# Patient Record
Sex: FEMALE | Race: WHITE | NOT HISPANIC OR LATINO
[De-identification: names, ages, dates, MRNs, and addresses within clinical notes are randomized per-mention and may not be internally consistent; named-entity substitution may affect disease eponyms.]

---

## 2017-10-05 ENCOUNTER — APPOINTMENT (OUTPATIENT)
Dept: WOUND CARE | Facility: CLINIC | Age: 67
End: 2017-10-05

## 2020-09-03 ENCOUNTER — TRANSCRIPTION ENCOUNTER (OUTPATIENT)
Age: 70
End: 2020-09-03

## 2020-11-28 ENCOUNTER — TRANSCRIPTION ENCOUNTER (OUTPATIENT)
Age: 70
End: 2020-11-28

## 2020-12-21 ENCOUNTER — TRANSCRIPTION ENCOUNTER (OUTPATIENT)
Age: 70
End: 2020-12-21

## 2022-07-07 ENCOUNTER — NON-APPOINTMENT (OUTPATIENT)
Age: 72
End: 2022-07-07

## 2022-09-26 ENCOUNTER — APPOINTMENT (OUTPATIENT)
Dept: ORTHOPEDIC SURGERY | Facility: CLINIC | Age: 72
End: 2022-09-26

## 2022-12-12 ENCOUNTER — NON-APPOINTMENT (OUTPATIENT)
Age: 72
End: 2022-12-12

## 2023-06-06 ENCOUNTER — APPOINTMENT (OUTPATIENT)
Dept: ORTHOPEDIC SURGERY | Facility: CLINIC | Age: 73
End: 2023-06-06
Payer: MEDICARE

## 2023-06-06 ENCOUNTER — NON-APPOINTMENT (OUTPATIENT)
Age: 73
End: 2023-06-06

## 2023-06-06 VITALS
DIASTOLIC BLOOD PRESSURE: 81 MMHG | BODY MASS INDEX: 32.07 KG/M2 | SYSTOLIC BLOOD PRESSURE: 145 MMHG | WEIGHT: 181 LBS | HEART RATE: 77 BPM | HEIGHT: 63 IN

## 2023-06-06 PROCEDURE — 99204 OFFICE O/P NEW MOD 45 MIN: CPT

## 2023-06-06 NOTE — REASON FOR VISIT
[Initial Visit] : an initial visit for [Hip Pain] : hip pain [Family Member] : family member [FreeTextEntry2] : Right THR 10/13/22, Revision Right THR 11/3/22

## 2023-06-06 NOTE — END OF VISIT
[Time Spent: ___ minutes] : I have spent [unfilled] minutes of time on the encounter. [FreeTextEntry3] : I, Dr. Nathan Funes MD, personally performed the evaluation and management services for this established patient who presented today with a new problem/exacerbation of an existing condition. That E/M includes conducting the examination, assessing all new/exacerbated conditions, and establishing a new plan of care. Today, MY ACP was here to assist with recording the history in my evaluation and management services of this new problem/exacerbated condition to be followed going forward.\par

## 2023-06-06 NOTE — HISTORY OF PRESENT ILLNESS
[de-identified] : Patient is a 72-year-old female who presents today for an evaluation regarding her right hip.  Originally she underwent a right total hip replacement performed by Dr. Gaming in [place] on October 13, 2022.  She states that following the hip replacement she did receive some physical therapy.  But shortly afterwards started to develop an increase in discomfort.  Follow-up x-rays show that she had a periprosthetic fracture.  Therefore she underwent a additional surgery and a revision as well as to repair the periprosthetic fracture of the proximal femur.  She states that following her revision.  She did follow with Dr. Gaming who advised her to try some physical therapy and anti-inflammatories.  However she states that the pain still persisted and now felt a click.  She states that she had some difficulty ambulating and therefore return back to the office several times.  Most recently approximately 3 weeks ago the patient did receive a MRI to evaluate the prosthesis.  However unfortunately about a week later while at home.  She states that she twisted and felt her right hip dislocate.  She was taken via EMS to [facility] where it was relocated.  And when she was discharged that same day.  She states that she return back to Dr. Gaming's office the following day.  Where it was advised that she may use an abduction brace.  But to start physical therapy slowly.  She presents today complaining of significant discomfort in her right hip.  As well as a sensation of a click and weird sensation when ambulating.  She states that she does have a sensation of it about to dislocate however she did not have a second dislocation.  As this does persist.  She decided to seek medical attention.  Therefore she presents here in the office today.

## 2023-06-06 NOTE — PHYSICAL EXAM
[Antalgic] : antalgic [LE] : Sensory: Intact in bilateral lower extremities [ALL] : dorsalis pedis, posterior tibial, femoral, popliteal, and radial 2+ and symmetric bilaterally [de-identified] : On physical examination of the right hip.  Patient is status post revision of a right total hip replacement from the anterior approach.  There is a well-healed surgical scar with no redness swelling heat or discharge noted.  There is some diffuse pain and tenderness on the lateral aspect and anterior laterally.  But also there is discomfort within the groin during ranges of motion.  She does have adequate range of motion in all planes.  However patient is very hesitant during the examination.  This is done both passive and actively.  There is no evidence of any palpable defect.  No evidence of any motor or sensory deficit.  Patient has good distal pulses no calf tenderness.  No evidence of limb length discrepancy. [de-identified] : No x-rays were performed in today's office visit.  However x-rays performed approximately 2 weeks ago with Dr. Gaming were reviewed.  It shows longstem prosthesis with cerclage wires due to the periprosthetic fracture.  The hardware is in place with no evidence of any fracture dislocation or loosening of the hardware.

## 2023-06-06 NOTE — DISCUSSION/SUMMARY
[de-identified] : After thorough history full examination and review of the x-rays.  It was explained to the patient that she is to continue with conservative management.  This includes a slow physical therapy with an exercise program at home along with ice packs/moist and anti-inflammatories as needed.  However as the pain still persist.  It is recommended that a full work-up for a possible infection.  This includes blood work such as CBC with differential BMP sed rate and C-reactive protein to be drawn.  It was explained to the patient that if any of these numbers are elevated and shows a possibility of infection.  An aspiration with collection referred to the lab is warranted.  She is advised to try this conservative management.  To notify the office once the blood work is performed to discuss its findings and further medical management.\par \par I, Dr. Funes, personally performed the evaluation and management services for this established patient who presents today with an orthopedic condition. The evaluation and management includes conducting the conditions and establishing a plan of care.\par \par Today, my ACP Jett Wild [facility], was here to assist with the patient in my evaluation and management services for this condition which will be followed going forward.\par \par 45 minutes were spent, face to face, in direct consultation with the patient. This includes reviewing the natural history of their Dx., eliciting the history, performing an orthopedic exam, review of the x-ray findings, forming a differential Dx and discussing all treatment options. This Includes both surgical and non-surgical treatments. I also reviewed all the risks and benefits of non-operative & operative Tx options, future impact into orthopedic functions/problems, activity restrictions both at home and at work, and all follow up requirements.\par \par

## 2023-07-18 ENCOUNTER — APPOINTMENT (OUTPATIENT)
Dept: ORTHOPEDIC SURGERY | Facility: CLINIC | Age: 73
End: 2023-07-18
Payer: MEDICARE

## 2023-07-18 VITALS — HEIGHT: 60 IN | WEIGHT: 180 LBS | BODY MASS INDEX: 35.34 KG/M2

## 2023-07-18 DIAGNOSIS — Z00.00 ENCOUNTER FOR GENERAL ADULT MEDICAL EXAMINATION W/OUT ABNORMAL FINDINGS: ICD-10-CM

## 2023-07-18 DIAGNOSIS — Z78.9 OTHER SPECIFIED HEALTH STATUS: ICD-10-CM

## 2023-07-18 PROCEDURE — 99204 OFFICE O/P NEW MOD 45 MIN: CPT

## 2023-07-18 PROCEDURE — 73503 X-RAY EXAM HIP UNI 4/> VIEWS: CPT | Mod: RT

## 2023-07-18 PROCEDURE — 72100 X-RAY EXAM L-S SPINE 2/3 VWS: CPT

## 2023-07-18 NOTE — DISCUSSION/SUMMARY
[de-identified] : The patient was advised of the diagnosis.  The natural history of the pathology was explained in full to the patient in layman's terms. All questions were answered.  The risks and benefits of surgical and non-surgical treatment alternatives were explained in full to the patient.\par \par The natural progression of Osteoarthritis was explained to the patient.  We discussed the possible treatment options from conservative to operative.  These included NSAIDS, Glucosamine and Chondrotin sulfate, and Physical Therapy as well different types of injections.  We also discussed that at some point they may progress to needed a JOHN.  Information and pamphlets were given when appropriate.\par \par Patient Complains of pain in Hip with a level that often reaches greater than a 8/10. The Pain has been progressively worsening of his/her treatment coarse. The pain has interfered with their ADLs and worsens with weight bearing. On exam pain worsens with ROM passive and active and I measured a limited ROM.\par \par After a period of more than 12 weeks physical therapy or exercise program done with me or another treating physician they have continued pain. The patient has failed a trial of NSAID medication or pain relieves if they were unable to tolerate NSAID medications. After a long discussion with the patient both the patient and I have decided we have exhausted all forms of less radical treatments and they would like to proceed with Total Hip Revision\par \par We discussed my findings and the natural history of their condition.  We talked about the details of the proposed surgery and the recovery.  We discussed the material risks, possible benefits and alternatives to surgery.  The risks include but are not limited to infection, bleeding and possible need for blood transfusion, fracture, bowel blockage, bladder retention or infection, need for reoperation, stiffness and/or limited range of motion, possible damage to nerves and blood vessels, failure of fixation of components, risk of deep vein thromboses and pulmonary embolism, wound healing problems, dislocation, and possible leg length discrepancy.  Although incredibly rare, we also discussed the risks of a cardiac event, stroke and even death during, or following, the surgery.  We discussed the type of implants the patient will be receiving and the type of fixation that will be used, as well as whether a robot or computer navigation aide will be used.  The patient understands they will need medical clearance and will attend a preoperative joint education class.  We also discussed the type of anesthesia they will receive, and the risks associated with hospital or rehab length of stay, obesity, diabetes and smoking. \par \par Progress note completed by Aurora Manning PA-C, working under the supervision of Dr. Stinson.

## 2023-07-18 NOTE — DATA REVIEWED
[FreeTextEntry1] : MRI R hip ([facility]) 4/27/23\par Status post total right hip arthroplasty. Moderate joint effusion decompressing into a fluid collection at level of the greater trochanter.\par Degenerative changes in lower lumbar spine.

## 2023-07-18 NOTE — IMAGING
[Disc space narrowing] : Disc space narrowing [Scoliosis] : Scoliosis [Right] : right hip with pelvis [Components well fixed, in good position] : Components well fixed, in good position [de-identified] : RIGHT HIP \par +abductor tenderness\par NVI\par +Trendelenburg gait\par Uses cane \par \par BACK \par +Bilateral lumbar tenderness\par Limited ROM in all planes

## 2023-07-18 NOTE — ASSESSMENT
[FreeTextEntry1] : 7/18/23: Hx of R hip revision due to periprosthetic fx; hx of 2 dislocations. Lumbar DDD and scoliosis. +Trendelenburg gait. Femur implant looks good, acetabular component is questionable. MRI reviewed today- abductor injury.  She had bloodwork done recently to r/o infection. Her combination of rigid spine, lumbar DDD, scoliosis, abductor weakness and revision of the hip likely led to her dislocations. She understands that she is at an increased risk of repeat dislocations. Discussed revision- dual mobility vs. cup exchange; possible abductor repair. Will obtain CT scan R hip to eval for bony alignment of the cup. Will plan for R JOHN revision- depuy gription with dual mobility and constrained liner. Backup reclaim liner. exactech alteon femoral component. Will need exactech heads. Acetabular component was size 36 50

## 2023-07-18 NOTE — HISTORY OF PRESENT ILLNESS
[9] : 9 [7] : 7 [Dull/Aching] : dull/aching [] : yes [de-identified] : 7/18/23: 71yo F presents with her daughter for evaluation of her right hip. She has a hx of R JOHN done on 10/15/22 (Dr. Gaming) and a revision done on 11/3/22 (due to periprosthetic fx- no known injury). States she never felt much relief after the revision. Within the past couple of months, she has dislocated the hip twice. States there was no injury at the time of dislocation. She continues to have feelings of instability or "slipping." She had bloodwork done recently to r/o infection. Takes Advil prn.  [FreeTextEntry5] : had right hip sx 10/15/22 revision on 11/3/22 pain has gotten worse, popped out and then fell has gone to the ER pain worse walking is walking with a cane for stability has tried Advil

## 2023-07-23 ENCOUNTER — FORM ENCOUNTER (OUTPATIENT)
Age: 73
End: 2023-07-23

## 2023-07-24 ENCOUNTER — APPOINTMENT (OUTPATIENT)
Dept: CT IMAGING | Facility: CLINIC | Age: 73
End: 2023-07-24
Payer: MEDICARE

## 2023-07-24 PROCEDURE — 73700 CT LOWER EXTREMITY W/O DYE: CPT | Mod: RT

## 2023-07-24 PROCEDURE — 76376 3D RENDER W/INTRP POSTPROCES: CPT | Mod: RT

## 2023-08-15 ENCOUNTER — APPOINTMENT (OUTPATIENT)
Dept: ORTHOPEDIC SURGERY | Facility: CLINIC | Age: 73
End: 2023-08-15
Payer: MEDICARE

## 2023-08-15 VITALS — WEIGHT: 180 LBS | BODY MASS INDEX: 35.34 KG/M2 | HEIGHT: 60 IN

## 2023-08-15 PROCEDURE — 99214 OFFICE O/P EST MOD 30 MIN: CPT

## 2023-08-15 NOTE — DISCUSSION/SUMMARY
[de-identified] : We had an extensive discussion regarding surgical intervention including risk, benefits and alternatives.  The risks include, but are not limited to infection, bleeding, injury to small nerves and blood vessels, pain, stiffness, phlebitis, DVT malunion, nonunion, and need for secondary procedures.  Preoperative, intraoperative and postoperative care were discussed and outlined to the patient as well.  The patient has had an opportunity to ask any question and all were answered to the best of my ability.  Progress note completed by Aurora Manning PA-C.

## 2023-08-15 NOTE — HISTORY OF PRESENT ILLNESS
[6] : 6 [4] : 4 [Dull/Aching] : dull/aching [Radiating] : radiating [] : yes [de-identified] : 8/15/23: Here for f/up R hip. She has obtained the CT. She is on the schedule for R JOHN revision for 10/12/23.   Previous doc:  7/18/23: 73yo F presents with her daughter for evaluation of her right hip. She has a hx of R JOHN done on 10/15/22 (Dr. Gaming) and a revision done on 11/3/22 (due to periprosthetic fx- no known injury). States she never felt much relief after the revision. Within the past couple of months, she has dislocated the hip twice. States there was no injury at the time of dislocation. She continues to have feelings of instability or "slipping." She had bloodwork done recently to r/o infection. Takes Advil prn.

## 2023-08-15 NOTE — IMAGING
[de-identified] : RIGHT HIP \par  +abductor tenderness\par  NVI\par  +Trendelenburg gait\par  Uses cane \par  \par  BACK \par  +Bilateral lumbar tenderness\par  Limited ROM in all planes

## 2023-08-15 NOTE — ASSESSMENT
[FreeTextEntry1] : 7/18/23: Hx of R hip revision due to periprosthetic fx; hx of 2 dislocations. Lumbar DDD and scoliosis. +Trendelenburg gait. Femur implant looks good, acetabular component is questionable. MRI reviewed today- abductor injury.  She had bloodwork done recently to r/o infection. Her combination of rigid spine, lumbar DDD, scoliosis, abductor weakness and revision of the hip likely led to her dislocations. She understands that she is at an increased risk of repeat dislocations. Discussed revision- dual mobility vs. cup exchange; possible abductor repair. Will obtain CT scan R hip to eval for bony alignment of the cup. Will plan for R JOHN revision- depuy gription with dual mobility and constrained liner. Backup reclaim liner. exactech alteon femoral component. Will need exactech heads. Acetabular component was size 36 50  8/15/23: Reviewed the right hip CT. Discussed R JOHN revision, r/b/a, and preop/postop periods with the patient and her daughter in detail. Informed that we could also wait and observe at this point for her to further heal and scar in- she understands that she may or may not dislocate again. She is on the schedule for revision for 10/12/23. I thought that her cup is somewhat neutral and anteverted. May revise part of the stem. Will eval abductors for any injury and do a capsular repair. PT rx provided. f/up in 6 weeks; will re-eval and see if we will proceed with surgery.

## 2023-08-23 ENCOUNTER — NON-APPOINTMENT (OUTPATIENT)
Age: 73
End: 2023-08-23

## 2023-09-22 ENCOUNTER — OUTPATIENT (OUTPATIENT)
Dept: OUTPATIENT SERVICES | Facility: HOSPITAL | Age: 73
LOS: 1 days | Discharge: HOME | End: 2023-09-22

## 2023-09-22 VITALS
DIASTOLIC BLOOD PRESSURE: 77 MMHG | OXYGEN SATURATION: 98 % | HEIGHT: 62 IN | RESPIRATION RATE: 18 BRPM | TEMPERATURE: 98 F | SYSTOLIC BLOOD PRESSURE: 137 MMHG | HEART RATE: 80 BPM | WEIGHT: 176.15 LBS

## 2023-09-22 DIAGNOSIS — Z96.662 PRESENCE OF LEFT ARTIFICIAL ANKLE JOINT: Chronic | ICD-10-CM

## 2023-09-22 DIAGNOSIS — M25.551 PAIN IN RIGHT HIP: ICD-10-CM

## 2023-09-22 DIAGNOSIS — F41.9 ANXIETY DISORDER, UNSPECIFIED: ICD-10-CM

## 2023-09-22 DIAGNOSIS — Z01.818 ENCOUNTER FOR OTHER PREPROCEDURAL EXAMINATION: ICD-10-CM

## 2023-09-22 DIAGNOSIS — Z01.812 ENCOUNTER FOR PREPROCEDURAL LABORATORY EXAMINATION: ICD-10-CM

## 2023-09-22 DIAGNOSIS — Z98.890 OTHER SPECIFIED POSTPROCEDURAL STATES: Chronic | ICD-10-CM

## 2023-09-22 DIAGNOSIS — Z87.19 PERSONAL HISTORY OF OTHER DISEASES OF THE DIGESTIVE SYSTEM: Chronic | ICD-10-CM

## 2023-09-22 DIAGNOSIS — N32.81 OVERACTIVE BLADDER: ICD-10-CM

## 2023-09-22 DIAGNOSIS — Z96.641 PRESENCE OF RIGHT ARTIFICIAL HIP JOINT: ICD-10-CM

## 2023-09-22 LAB
A1C WITH ESTIMATED AVERAGE GLUCOSE RESULT: 5.5 % — SIGNIFICANT CHANGE UP (ref 4–5.6)
ALBUMIN SERPL ELPH-MCNC: 3.7 G/DL — SIGNIFICANT CHANGE UP (ref 3.3–5)
ALP SERPL-CCNC: 99 U/L — SIGNIFICANT CHANGE UP (ref 40–120)
ALT FLD-CCNC: 23 U/L — SIGNIFICANT CHANGE UP (ref 12–78)
ANION GAP SERPL CALC-SCNC: 7 MMOL/L — SIGNIFICANT CHANGE UP (ref 5–17)
APTT BLD: 58.5 SEC — HIGH (ref 24.5–35.6)
AST SERPL-CCNC: 16 U/L — SIGNIFICANT CHANGE UP (ref 15–37)
BILIRUB SERPL-MCNC: 0.4 MG/DL — SIGNIFICANT CHANGE UP (ref 0.2–1.2)
BLD GP AB SCN SERPL QL: SIGNIFICANT CHANGE UP
BUN SERPL-MCNC: 25 MG/DL — HIGH (ref 7–23)
CALCIUM SERPL-MCNC: 9.2 MG/DL — SIGNIFICANT CHANGE UP (ref 8.5–10.1)
CHLORIDE SERPL-SCNC: 106 MMOL/L — SIGNIFICANT CHANGE UP (ref 96–108)
CO2 SERPL-SCNC: 26 MMOL/L — SIGNIFICANT CHANGE UP (ref 22–31)
CREAT SERPL-MCNC: 0.82 MG/DL — SIGNIFICANT CHANGE UP (ref 0.5–1.3)
EGFR: 75 ML/MIN/1.73M2 — SIGNIFICANT CHANGE UP
ESTIMATED AVERAGE GLUCOSE: 111 MG/DL — SIGNIFICANT CHANGE UP (ref 68–114)
GLUCOSE SERPL-MCNC: 99 MG/DL — SIGNIFICANT CHANGE UP (ref 70–99)
HCT VFR BLD CALC: 39.9 % — SIGNIFICANT CHANGE UP (ref 34.5–45)
HGB BLD-MCNC: 12.7 G/DL — SIGNIFICANT CHANGE UP (ref 11.5–15.5)
INR BLD: 0.88 RATIO — SIGNIFICANT CHANGE UP (ref 0.85–1.18)
MCHC RBC-ENTMCNC: 28.1 PG — SIGNIFICANT CHANGE UP (ref 27–34)
MCHC RBC-ENTMCNC: 31.8 G/DL — LOW (ref 32–36)
MCV RBC AUTO: 88.3 FL — SIGNIFICANT CHANGE UP (ref 80–100)
MRSA PCR RESULT.: SIGNIFICANT CHANGE UP
NRBC # BLD: 0 /100 WBCS — SIGNIFICANT CHANGE UP (ref 0–0)
PLATELET # BLD AUTO: 216 K/UL — SIGNIFICANT CHANGE UP (ref 150–400)
POTASSIUM SERPL-MCNC: 4.1 MMOL/L — SIGNIFICANT CHANGE UP (ref 3.5–5.3)
POTASSIUM SERPL-SCNC: 4.1 MMOL/L — SIGNIFICANT CHANGE UP (ref 3.5–5.3)
PROT SERPL-MCNC: 8 GM/DL — SIGNIFICANT CHANGE UP (ref 6–8.3)
PROTHROM AB SERPL-ACNC: 10.5 SEC — SIGNIFICANT CHANGE UP (ref 9.5–13)
RBC # BLD: 4.52 M/UL — SIGNIFICANT CHANGE UP (ref 3.8–5.2)
RBC # FLD: 13 % — SIGNIFICANT CHANGE UP (ref 10.3–14.5)
S AUREUS DNA NOSE QL NAA+PROBE: DETECTED
SODIUM SERPL-SCNC: 139 MMOL/L — SIGNIFICANT CHANGE UP (ref 135–145)
VIT D25+D1,25 OH+D1,25 PNL SERPL-MCNC: 49.9 PG/ML — SIGNIFICANT CHANGE UP (ref 19.9–79.3)
WBC # BLD: 7.88 K/UL — SIGNIFICANT CHANGE UP (ref 3.8–10.5)
WBC # FLD AUTO: 7.88 K/UL — SIGNIFICANT CHANGE UP (ref 3.8–10.5)

## 2023-09-22 RX ORDER — SERTRALINE 25 MG/1
1 TABLET, FILM COATED ORAL
Refills: 0 | DISCHARGE

## 2023-09-22 RX ORDER — SEMAGLUTIDE 0.68 MG/ML
1 INJECTION, SOLUTION SUBCUTANEOUS
Refills: 0 | DISCHARGE

## 2023-09-22 RX ORDER — ALBUTEROL 90 UG/1
2 AEROSOL, METERED ORAL
Refills: 0 | DISCHARGE

## 2023-09-22 RX ORDER — OXYBUTYNIN CHLORIDE 5 MG
1 TABLET ORAL
Refills: 0 | DISCHARGE

## 2023-09-22 RX ORDER — BUPROPION HYDROCHLORIDE 150 MG/1
1 TABLET, EXTENDED RELEASE ORAL
Refills: 0 | DISCHARGE

## 2023-09-22 NOTE — PHYSICAL THERAPY INITIAL EVALUATION ADULT - NS ASR RISK AREAS PT EVAL
normal... Well appearing, awake, alert, oriented to person, place, time/situation and in no apparent distress. fall

## 2023-09-22 NOTE — H&P PST ADULT - MUSCULOSKELETAL COMMENTS
Right hip pain Right Hip limited rom and pain with ambulation Right Hip limited rom and pain with ambulation, uses cane

## 2023-09-22 NOTE — H&P PST ADULT - ASSESSMENT
Right hip pain for right revision total hip replacement  CAPRINI SCORE [CLOT]    AGE RELATED RISK FACTORS                                                       MOBILITY RELATED FACTORS  [ ] Age 41-60 years                                            (1 Point)                  [ ] Bed rest                                                        (1 Point)  [ ] Age: 61-74 years                                           (2 Points)                 [ ] Plaster cast                                                   (2 Points)  [ x] Age= 75 years                                              (3 Points)                 [ ] Bed bound for more than 72 hours                 (2 Points)    DISEASE RELATED RISK FACTORS                                               GENDER SPECIFIC FACTORS  [ ] Edema in the lower extremities                       (1 Point)                  [ ] Pregnancy                                                     (1 Point)  [ ] Varicose veins                                               (1 Point)                  [ ] Post-partum < 6 weeks                                   (1 Point)             [ ] BMI > 25 Kg/m2                                            (1 Point)                  [ ] Hormonal therapy  or oral contraception          (1 Point)                 [ ] Sepsis (in the previous month)                        (1 Point)                  [ ] History of pregnancy complications                 (1 point)  [ ] Pneumonia or serious lung disease                                               [ ] Unexplained or recurrent                     (1 Point)           (in the previous month)                               (1 Point)  [ ] Abnormal pulmonary function test                     (1 Point)                 SURGERY RELATED RISK FACTORS  [ ] Acute myocardial infarction                              (1 Point)                 [ ]  Section                                             (1 Point)  [ ] Congestive heart failure (in the previous month)  (1 Point)               [ ] Minor surgery                                                  (1 Point)   [ ] Inflammatory bowel disease                             (1 Point)                 [ ] Arthroscopic surgery                                        (2 Points)  [ ] Central venous access                                      (2 Points)                [ ] General surgery lasting more than 45 minutes   (2 Points)       [ ] Stroke (in the previous month)                          (5 Points)               [x ] Elective arthroplasty                                         (5 Points)                                                                                                                                               HEMATOLOGY RELATED FACTORS                                                 TRAUMA RELATED RISK FACTORS  [ ] Prior episodes of VTE                                     (3 Points)                [ ] Fracture of the hip, pelvis, or leg                       (5 Points)  [ ] Positive family history for VTE                         (3 Points)                 [ ] Acute spinal cord injury (in the previous month)  (5 Points)  [ ] Prothrombin 79013 A                                     (3 Points)                 [ ] Paralysis  (less than 1 month)                             (5 Points)  [ ] Factor V Leiden                                             (3 Points)                  [ ] Multiple Trauma within 1 month                        (5 Points)  [ ] Lupus anticoagulants                                     (3 Points)                                                           [ ] Anticardiolipin antibodies                               (3 Points)                                                       [ ] High homocysteine in the blood                      (3 Points)                                             [ ] Other congenital or acquired thrombophilia      (3 Points)                                                [ ] Heparin induced thrombocytopenia                  (3 Points)                                          Total Score [      8    ]    Caprini Score 0 - 2:  Low Risk, No VTE Prophylaxis required for most patients, encourage ambulation  Caprini Score 3 - 6:  At Risk, pharmacologic VTE prophylaxis is indicated for most patients (in the absence of a contraindication)  Caprini Score Greater than or = 7:  High Risk, pharmacologic VTE prophylaxis is indicated for most patients (in the absence of a contraindication)

## 2023-09-22 NOTE — PHYSICAL THERAPY INITIAL EVALUATION ADULT - PERTINENT HX OF CURRENT PROBLEM, REHAB EVAL
Revision of right total hip arthroplasty by posterior approach due to pain and recurrent dislocation.

## 2023-09-22 NOTE — H&P PST ADULT - NSICDXPROCEDURE_GEN_ALL_CORE_FT
PROCEDURES:  Revision of right total hip arthroplasty by posterior approach 22-Sep-2023 09:45:10  Amy Murphy P

## 2023-09-22 NOTE — PHYSICAL THERAPY INITIAL EVALUATION ADULT - GAIT DEVIATIONS NOTED, PT EVAL
decreased ismael/increased time in double stance/decreased velocity of limb motion/decreased step length/decreased stride length

## 2023-09-22 NOTE — PHYSICAL THERAPY INITIAL EVALUATION ADULT - TINETTI GAIT TEST, REHAB EVAL
Indication of gait -0/1,   Step Length and height -1/2,   Foot Clearance -2/2,   Step Symmetry - 0/1,  Step Continuity -1/1,   Path -2/ 2,   Trunk -1/2,   Walking Time -1/1,   Total Score -8 /12

## 2023-09-22 NOTE — H&P PST ADULT - HISTORY OF PRESENT ILLNESS
73 year old female presents to PST. Patient has a PMHX of. Patient has left knee pain 2/2 Left knee total knee replacement and has a planned Left total knee replacement with Dr. Giron on 7/3/2023.     Goal is to be pain free and get back to all activities.     Patient denies any recent travel, cough, fever, chills or recent sick contacts.        73 year old female presents to PST. Patient has a PMHX of Depression and anxiety. Patient has Right knee pain 2/2 painful Right total hip replacement and has a planned Right Revision Total Hip replacement.  Goal is to be pain free and get back to all activities.     Patient denies any recent travel, cough, fever, chills or recent sick contacts.

## 2023-09-22 NOTE — H&P PST ADULT - NSICDXPASTSURGICALHX_GEN_ALL_CORE_FT
PAST SURGICAL HISTORY:  H/O diverticulitis of colon     H/O total ankle replacement, left     S/P foot surgery, left

## 2023-09-22 NOTE — PHYSICAL THERAPY INITIAL EVALUATION ADULT - ADDITIONAL COMMENTS
As per pt : There are 3 steps, c close suraj rails, at the entry of the senior housing and no steps to negotiate once indoors. Pt has a tub/shower combo c fixed/retractable shower head, grab bar, and standard toilet with adequate space to fit a commode over it. Pt owns a Rolling Walker and it's in good working condition. Average pain level at rest is 5/10. Pain increases to 6/10 c exercise and activities. Pt takes ibuprofen for pain. Pt has experience of adverse effects with Codeine.  Pt is on out-pt physical therapy 2/wk at present. Pt reported fell about 2 months ago and dislocated her R hip. Pt wears glasses for reading and no need for hearing aid. Pt is R handed and drives.

## 2023-09-22 NOTE — PHYSICAL THERAPY INITIAL EVALUATION ADULT - TINETTI BALANCE TEST, REHAB EVAL
Sitting Balance - 1/1 , Rises From Chair - 1/2, Attempts to rise -1 /2 , Immediate Standing Balance - 1/2,  Standing Balance - 1/2, Nudged - 2 /2, Eyes Closed -1 /1,  Turning 360 Deg -1 /2, Sitting down - 1/2, Balance Score - 10/16

## 2023-09-22 NOTE — PHYSICAL THERAPY INITIAL EVALUATION ADULT - GENERAL OBSERVATIONS, REHAB EVAL
Patient was seen seated  in chair in PT/OT preoperative assessment room with no apparent distress. Height:   5'3"    ; weight :  173   lbs.

## 2023-09-26 ENCOUNTER — APPOINTMENT (OUTPATIENT)
Dept: ORTHOPEDIC SURGERY | Facility: CLINIC | Age: 73
End: 2023-09-26

## 2023-09-26 RX ORDER — MUPIROCIN 20 MG/G
1 OINTMENT TOPICAL
Qty: 1 | Refills: 0
Start: 2023-09-26 | End: 2023-09-30

## 2023-10-11 ENCOUNTER — TRANSCRIPTION ENCOUNTER (OUTPATIENT)
Age: 73
End: 2023-10-11

## 2023-10-11 LAB — APTT BLD: 55.9 SEC — HIGH (ref 24.5–35.6)

## 2023-10-12 ENCOUNTER — INPATIENT (INPATIENT)
Facility: HOSPITAL | Age: 73
LOS: 5 days | Discharge: NURSING, REHAB OR LONG-TERM CARE | End: 2023-10-18
Attending: ORTHOPAEDIC SURGERY | Admitting: ORTHOPAEDIC SURGERY
Payer: MEDICARE

## 2023-10-12 ENCOUNTER — RESULT REVIEW (OUTPATIENT)
Age: 73
End: 2023-10-12

## 2023-10-12 ENCOUNTER — APPOINTMENT (OUTPATIENT)
Dept: ORTHOPEDIC SURGERY | Facility: HOSPITAL | Age: 73
End: 2023-10-12
Payer: MEDICARE

## 2023-10-12 VITALS
RESPIRATION RATE: 17 BRPM | WEIGHT: 176.15 LBS | HEART RATE: 96 BPM | TEMPERATURE: 99 F | DIASTOLIC BLOOD PRESSURE: 72 MMHG | OXYGEN SATURATION: 96 % | HEIGHT: 62 IN | SYSTOLIC BLOOD PRESSURE: 109 MMHG

## 2023-10-12 DIAGNOSIS — Z87.19 PERSONAL HISTORY OF OTHER DISEASES OF THE DIGESTIVE SYSTEM: Chronic | ICD-10-CM

## 2023-10-12 DIAGNOSIS — Z96.662 PRESENCE OF LEFT ARTIFICIAL ANKLE JOINT: Chronic | ICD-10-CM

## 2023-10-12 DIAGNOSIS — Z98.890 OTHER SPECIFIED POSTPROCEDURAL STATES: Chronic | ICD-10-CM

## 2023-10-12 LAB
ANION GAP SERPL CALC-SCNC: 7 MMOL/L — SIGNIFICANT CHANGE UP (ref 5–17)
APTT BLD: 46.4 SEC — HIGH (ref 24.5–35.6)
APTT BLD: 46.4 SEC — HIGH (ref 24.5–35.6)
BLD GP AB SCN SERPL QL: SIGNIFICANT CHANGE UP
BLD GP AB SCN SERPL QL: SIGNIFICANT CHANGE UP
BUN SERPL-MCNC: 19 MG/DL — SIGNIFICANT CHANGE UP (ref 7–23)
CALCIUM SERPL-MCNC: 8.8 MG/DL — SIGNIFICANT CHANGE UP (ref 8.5–10.1)
CHLORIDE SERPL-SCNC: 108 MMOL/L — SIGNIFICANT CHANGE UP (ref 96–108)
CO2 SERPL-SCNC: 23 MMOL/L — SIGNIFICANT CHANGE UP (ref 22–31)
CREAT SERPL-MCNC: 0.8 MG/DL — SIGNIFICANT CHANGE UP (ref 0.5–1.3)
EGFR: 77 ML/MIN/1.73M2 — SIGNIFICANT CHANGE UP
GLUCOSE SERPL-MCNC: 106 MG/DL — HIGH (ref 70–99)
HCT VFR BLD CALC: 39.3 % — SIGNIFICANT CHANGE UP (ref 34.5–45)
HGB BLD-MCNC: 12.1 G/DL — SIGNIFICANT CHANGE UP (ref 11.5–15.5)
MCHC RBC-ENTMCNC: 28.3 PG — SIGNIFICANT CHANGE UP (ref 27–34)
MCHC RBC-ENTMCNC: 30.8 G/DL — LOW (ref 32–36)
MCV RBC AUTO: 91.8 FL — SIGNIFICANT CHANGE UP (ref 80–100)
NRBC # BLD: 0 /100 WBCS — SIGNIFICANT CHANGE UP (ref 0–0)
PLATELET # BLD AUTO: 206 K/UL — SIGNIFICANT CHANGE UP (ref 150–400)
POTASSIUM SERPL-MCNC: 4.4 MMOL/L — SIGNIFICANT CHANGE UP (ref 3.5–5.3)
POTASSIUM SERPL-SCNC: 4.4 MMOL/L — SIGNIFICANT CHANGE UP (ref 3.5–5.3)
RBC # BLD: 4.28 M/UL — SIGNIFICANT CHANGE UP (ref 3.8–5.2)
RBC # FLD: 13.1 % — SIGNIFICANT CHANGE UP (ref 10.3–14.5)
SODIUM SERPL-SCNC: 138 MMOL/L — SIGNIFICANT CHANGE UP (ref 135–145)
WBC # BLD: 12.48 K/UL — HIGH (ref 3.8–10.5)
WBC # FLD AUTO: 12.48 K/UL — HIGH (ref 3.8–10.5)

## 2023-10-12 PROCEDURE — 73501 X-RAY EXAM HIP UNI 1 VIEW: CPT | Mod: 26,LT

## 2023-10-12 PROCEDURE — 27299 UNLISTED PX PELVIS/HIP JOINT: CPT

## 2023-10-12 PROCEDURE — 88331 PATH CONSLTJ SURG 1 BLK 1SPC: CPT | Mod: 26

## 2023-10-12 PROCEDURE — 88300 SURGICAL PATH GROSS: CPT | Mod: 26,59

## 2023-10-12 PROCEDURE — 27134 REVISE HIP JOINT REPLACEMENT: CPT | Mod: RT

## 2023-10-12 PROCEDURE — 88305 TISSUE EXAM BY PATHOLOGIST: CPT | Mod: 26

## 2023-10-12 DEVICE — ANCHOR BIO-COMP 3 FIBERWIRE 5.5X14.7MM: Type: IMPLANTABLE DEVICE | Site: RIGHT | Status: FUNCTIONAL

## 2023-10-12 DEVICE — LINER ACET PINNACLE 28X49MM: Type: IMPLANTABLE DEVICE | Site: RIGHT | Status: FUNCTIONAL

## 2023-10-12 DEVICE — IMPLANTABLE DEVICE: Type: IMPLANTABLE DEVICE | Site: RIGHT | Status: FUNCTIONAL

## 2023-10-12 DEVICE — SCREW ACET CANC PINN 6.5X20MM: Type: IMPLANTABLE DEVICE | Site: RIGHT | Status: FUNCTIONAL

## 2023-10-12 DEVICE — SCREW CANN BONE: Type: IMPLANTABLE DEVICE | Site: RIGHT | Status: FUNCTIONAL

## 2023-10-12 DEVICE — SCREW ACET CANC PINN 6.5X25MM: Type: IMPLANTABLE DEVICE | Site: RIGHT | Status: FUNCTIONAL

## 2023-10-12 DEVICE — ANCHOR PEEK SWIVLCK C 4.75X19.1MM: Type: IMPLANTABLE DEVICE | Site: RIGHT | Status: FUNCTIONAL

## 2023-10-12 DEVICE — SCREW BONE CANN PINNACLE 6.5X15MM: Type: IMPLANTABLE DEVICE | Site: RIGHT | Status: FUNCTIONAL

## 2023-10-12 RX ORDER — ACETAMINOPHEN 500 MG
1000 TABLET ORAL EVERY 8 HOURS
Refills: 0 | Status: DISCONTINUED | OUTPATIENT
Start: 2023-10-12 | End: 2023-10-18

## 2023-10-12 RX ORDER — SODIUM CHLORIDE 9 MG/ML
1000 INJECTION, SOLUTION INTRAVENOUS
Refills: 0 | Status: DISCONTINUED | OUTPATIENT
Start: 2023-10-12 | End: 2023-10-12

## 2023-10-12 RX ORDER — ACETAMINOPHEN 500 MG
1000 TABLET ORAL ONCE
Refills: 0 | Status: COMPLETED | OUTPATIENT
Start: 2023-10-12 | End: 2023-10-12

## 2023-10-12 RX ORDER — MAGNESIUM HYDROXIDE 400 MG/1
30 TABLET, CHEWABLE ORAL DAILY
Refills: 0 | Status: DISCONTINUED | OUTPATIENT
Start: 2023-10-12 | End: 2023-10-18

## 2023-10-12 RX ORDER — OXYCODONE HYDROCHLORIDE 5 MG/1
5 TABLET ORAL
Refills: 0 | Status: DISCONTINUED | OUTPATIENT
Start: 2023-10-12 | End: 2023-10-18

## 2023-10-12 RX ORDER — ONDANSETRON 8 MG/1
4 TABLET, FILM COATED ORAL EVERY 6 HOURS
Refills: 0 | Status: DISCONTINUED | OUTPATIENT
Start: 2023-10-12 | End: 2023-10-18

## 2023-10-12 RX ORDER — DEXAMETHASONE 0.5 MG/5ML
10 ELIXIR ORAL ONCE
Refills: 0 | Status: COMPLETED | OUTPATIENT
Start: 2023-10-13 | End: 2023-10-13

## 2023-10-12 RX ORDER — CEFAZOLIN SODIUM 1 G
2000 VIAL (EA) INJECTION EVERY 8 HOURS
Refills: 0 | Status: COMPLETED | OUTPATIENT
Start: 2023-10-12 | End: 2023-10-13

## 2023-10-12 RX ORDER — HYDROMORPHONE HYDROCHLORIDE 2 MG/ML
0.2 INJECTION INTRAMUSCULAR; INTRAVENOUS; SUBCUTANEOUS
Refills: 0 | Status: DISCONTINUED | OUTPATIENT
Start: 2023-10-12 | End: 2023-10-12

## 2023-10-12 RX ORDER — RIVAROXABAN 15 MG-20MG
10 KIT ORAL DAILY
Refills: 0 | Status: DISCONTINUED | OUTPATIENT
Start: 2023-10-13 | End: 2023-10-18

## 2023-10-12 RX ORDER — SODIUM CHLORIDE 9 MG/ML
1000 INJECTION, SOLUTION INTRAVENOUS
Refills: 0 | Status: DISCONTINUED | OUTPATIENT
Start: 2023-10-13 | End: 2023-10-14

## 2023-10-12 RX ORDER — HYDROMORPHONE HYDROCHLORIDE 2 MG/ML
0.5 INJECTION INTRAMUSCULAR; INTRAVENOUS; SUBCUTANEOUS
Refills: 0 | Status: COMPLETED | OUTPATIENT
Start: 2023-10-12 | End: 2023-10-19

## 2023-10-12 RX ORDER — KETOROLAC TROMETHAMINE 30 MG/ML
30 SYRINGE (ML) INJECTION EVERY 8 HOURS
Refills: 0 | Status: DISCONTINUED | OUTPATIENT
Start: 2023-10-12 | End: 2023-10-13

## 2023-10-12 RX ORDER — SENNA PLUS 8.6 MG/1
2 TABLET ORAL AT BEDTIME
Refills: 0 | Status: DISCONTINUED | OUTPATIENT
Start: 2023-10-12 | End: 2023-10-18

## 2023-10-12 RX ORDER — ASCORBIC ACID 60 MG
500 TABLET,CHEWABLE ORAL
Refills: 0 | Status: DISCONTINUED | OUTPATIENT
Start: 2023-10-12 | End: 2023-10-18

## 2023-10-12 RX ORDER — OXYCODONE HYDROCHLORIDE 5 MG/1
5 TABLET ORAL EVERY 4 HOURS
Refills: 0 | Status: DISCONTINUED | OUTPATIENT
Start: 2023-10-12 | End: 2023-10-13

## 2023-10-12 RX ORDER — OXYCODONE HYDROCHLORIDE 5 MG/1
10 TABLET ORAL
Refills: 0 | Status: DISCONTINUED | OUTPATIENT
Start: 2023-10-12 | End: 2023-10-18

## 2023-10-12 RX ORDER — INFLUENZA VIRUS VACCINE 15; 15; 15; 15 UG/.5ML; UG/.5ML; UG/.5ML; UG/.5ML
0.7 SUSPENSION INTRAMUSCULAR ONCE
Refills: 0 | Status: DISCONTINUED | OUTPATIENT
Start: 2023-10-12 | End: 2023-10-18

## 2023-10-12 RX ORDER — ONDANSETRON 8 MG/1
4 TABLET, FILM COATED ORAL ONCE
Refills: 0 | Status: DISCONTINUED | OUTPATIENT
Start: 2023-10-12 | End: 2023-10-12

## 2023-10-12 RX ORDER — PANTOPRAZOLE SODIUM 20 MG/1
40 TABLET, DELAYED RELEASE ORAL
Refills: 0 | Status: DISCONTINUED | OUTPATIENT
Start: 2023-10-12 | End: 2023-10-18

## 2023-10-12 RX ORDER — POLYETHYLENE GLYCOL 3350 17 G/17G
17 POWDER, FOR SOLUTION ORAL AT BEDTIME
Refills: 0 | Status: DISCONTINUED | OUTPATIENT
Start: 2023-10-12 | End: 2023-10-18

## 2023-10-12 RX ORDER — HYDROMORPHONE HYDROCHLORIDE 2 MG/ML
0.5 INJECTION INTRAMUSCULAR; INTRAVENOUS; SUBCUTANEOUS
Refills: 0 | Status: DISCONTINUED | OUTPATIENT
Start: 2023-10-12 | End: 2023-10-18

## 2023-10-12 RX ADMIN — HYDROMORPHONE HYDROCHLORIDE 0.2 MILLIGRAM(S): 2 INJECTION INTRAMUSCULAR; INTRAVENOUS; SUBCUTANEOUS at 17:52

## 2023-10-12 RX ADMIN — SODIUM CHLORIDE 75 MILLILITER(S): 9 INJECTION, SOLUTION INTRAVENOUS at 18:39

## 2023-10-12 RX ADMIN — OXYCODONE HYDROCHLORIDE 10 MILLIGRAM(S): 5 TABLET ORAL at 21:44

## 2023-10-12 RX ADMIN — HYDROMORPHONE HYDROCHLORIDE 0.2 MILLIGRAM(S): 2 INJECTION INTRAMUSCULAR; INTRAVENOUS; SUBCUTANEOUS at 18:07

## 2023-10-12 RX ADMIN — Medication 30 MILLIGRAM(S): at 21:00

## 2023-10-12 RX ADMIN — Medication 30 MILLIGRAM(S): at 20:27

## 2023-10-12 RX ADMIN — OXYCODONE HYDROCHLORIDE 10 MILLIGRAM(S): 5 TABLET ORAL at 22:44

## 2023-10-12 RX ADMIN — Medication 1000 MILLIGRAM(S): at 20:00

## 2023-10-12 RX ADMIN — POLYETHYLENE GLYCOL 3350 17 GRAM(S): 17 POWDER, FOR SOLUTION ORAL at 21:43

## 2023-10-12 RX ADMIN — HYDROMORPHONE HYDROCHLORIDE 0.2 MILLIGRAM(S): 2 INJECTION INTRAMUSCULAR; INTRAVENOUS; SUBCUTANEOUS at 18:26

## 2023-10-12 RX ADMIN — SENNA PLUS 2 TABLET(S): 8.6 TABLET ORAL at 21:43

## 2023-10-12 RX ADMIN — Medication 400 MILLIGRAM(S): at 19:18

## 2023-10-12 RX ADMIN — Medication 100 MILLIGRAM(S): at 21:43

## 2023-10-12 NOTE — BRIEF OPERATIVE NOTE - NSICDXBRIEFPROCEDURE_GEN_ALL_CORE_FT
PROCEDURES:  Revision of right total hip arthroplasty by posterior approach 12-Oct-2023 17:39:20  Syed Poole  Repair, abductor tendon, hip 12-Oct-2023 17:39:28  Syed Poole

## 2023-10-12 NOTE — PATIENT PROFILE ADULT - FALL HARM RISK - HARM RISK INTERVENTIONS

## 2023-10-12 NOTE — CONSULT NOTE ADULT - SUBJECTIVE AND OBJECTIVE BOX
SO VIRAMONTES is a 73y Female s/p REVISION RIGHT TOTAL HIP ARTHROPLASTY      w/ h/o Anxiety and depression    Overactive bladder    H/O: obesity    H/O colectomy      denies any chest pain shortness of breath palpitation dizziness lightheadedness nausea vomiting fever or chills    H/O total ankle replacement, left    S/P foot surgery, left    H/O diverticulitis of colon        SH: doesnot smoke or drink at this time    sulfa drugs (Diarrhea)  codeine (Diarrhea)  Cipro (Diarrhea)  Augmentin (Diarrhea)    acetaminophen     Tablet .. 1000 milliGRAM(s) Oral every 8 hours  ascorbic acid 500 milliGRAM(s) Oral two times a day  ceFAZolin   IVPB 2000 milliGRAM(s) IV Intermittent every 8 hours  HYDROmorphone  Injectable 0.5 milliGRAM(s) IV Push every 3 hours PRN  influenza  Vaccine (HIGH DOSE) 0.7 milliLiter(s) IntraMuscular once  ketorolac   Injectable 30 milliGRAM(s) IV Push every 8 hours  magnesium hydroxide Suspension 30 milliLiter(s) Oral daily PRN  multivitamin 1 Tablet(s) Oral daily  ondansetron Injectable 4 milliGRAM(s) IV Push every 6 hours PRN  oxyCODONE    IR 5 milliGRAM(s) Oral every 3 hours PRN  oxyCODONE    IR 10 milliGRAM(s) Oral every 3 hours PRN  oxyCODONE    IR 5 milliGRAM(s) Oral every 4 hours  pantoprazole    Tablet 40 milliGRAM(s) Oral before breakfast  polyethylene glycol 3350 17 Gram(s) Oral at bedtime  senna 2 Tablet(s) Oral at bedtime    T(C): 36.6 (10-12-23 @ 19:02), Max: 37.3 (10-12-23 @ 12:58)  HR: 89 (10-12-23 @ 19:02) (82 - 97)  BP: 133/78 (10-12-23 @ 19:02) (108/80 - 143/79)  RR: 18 (10-12-23 @ 19:02) (12 - 18)  SpO2: 92% (10-12-23 @ 19:02) (92% - 100%)  HEENT unremarkable  neck no JVD or bruit  heart normal S1 S2 RRR no gallops or rubs  chest clear to auscultation  abd sof nontender non distended +bs  ext no calf tenderness    A/P   DVT PX  pain control  bowel regimen   wound care as per ortho  GI PX  antiemetics prn  incentive spirometer
Never smoker

## 2023-10-13 LAB
ANION GAP SERPL CALC-SCNC: 7 MMOL/L — SIGNIFICANT CHANGE UP (ref 5–17)
BUN SERPL-MCNC: 23 MG/DL — SIGNIFICANT CHANGE UP (ref 7–23)
CALCIUM SERPL-MCNC: 8.1 MG/DL — LOW (ref 8.5–10.1)
CHLORIDE SERPL-SCNC: 103 MMOL/L — SIGNIFICANT CHANGE UP (ref 96–108)
CO2 SERPL-SCNC: 27 MMOL/L — SIGNIFICANT CHANGE UP (ref 22–31)
CREAT SERPL-MCNC: 0.82 MG/DL — SIGNIFICANT CHANGE UP (ref 0.5–1.3)
EGFR: 75 ML/MIN/1.73M2 — SIGNIFICANT CHANGE UP
GLUCOSE SERPL-MCNC: 125 MG/DL — HIGH (ref 70–99)
GRAM STN FLD: SIGNIFICANT CHANGE UP
HCT VFR BLD CALC: 29.2 % — LOW (ref 34.5–45)
HGB BLD-MCNC: 9.2 G/DL — LOW (ref 11.5–15.5)
MCHC RBC-ENTMCNC: 28.8 PG — SIGNIFICANT CHANGE UP (ref 27–34)
MCHC RBC-ENTMCNC: 31.5 G/DL — LOW (ref 32–36)
MCV RBC AUTO: 91.3 FL — SIGNIFICANT CHANGE UP (ref 80–100)
NRBC # BLD: 0 /100 WBCS — SIGNIFICANT CHANGE UP (ref 0–0)
PLATELET # BLD AUTO: 184 K/UL — SIGNIFICANT CHANGE UP (ref 150–400)
POTASSIUM SERPL-MCNC: 4.6 MMOL/L — SIGNIFICANT CHANGE UP (ref 3.5–5.3)
POTASSIUM SERPL-SCNC: 4.6 MMOL/L — SIGNIFICANT CHANGE UP (ref 3.5–5.3)
RBC # BLD: 3.2 M/UL — LOW (ref 3.8–5.2)
RBC # FLD: 12.8 % — SIGNIFICANT CHANGE UP (ref 10.3–14.5)
SODIUM SERPL-SCNC: 137 MMOL/L — SIGNIFICANT CHANGE UP (ref 135–145)
SPECIMEN SOURCE: SIGNIFICANT CHANGE UP
WBC # BLD: 7.21 K/UL — SIGNIFICANT CHANGE UP (ref 3.8–10.5)
WBC # FLD AUTO: 7.21 K/UL — SIGNIFICANT CHANGE UP (ref 3.8–10.5)

## 2023-10-13 RX ORDER — ALBUTEROL 90 UG/1
2.5 AEROSOL, METERED ORAL ONCE
Refills: 0 | Status: COMPLETED | OUTPATIENT
Start: 2023-10-13 | End: 2023-10-13

## 2023-10-13 RX ORDER — HYDROCORTISONE 1 %
1 OINTMENT (GRAM) TOPICAL DAILY
Refills: 0 | Status: DISCONTINUED | OUTPATIENT
Start: 2023-10-13 | End: 2023-10-18

## 2023-10-13 RX ORDER — LANOLIN ALCOHOL/MO/W.PET/CERES
3 CREAM (GRAM) TOPICAL AT BEDTIME
Refills: 0 | Status: DISCONTINUED | OUTPATIENT
Start: 2023-10-13 | End: 2023-10-18

## 2023-10-13 RX ORDER — SERTRALINE 25 MG/1
25 TABLET, FILM COATED ORAL DAILY
Refills: 0 | Status: DISCONTINUED | OUTPATIENT
Start: 2023-10-13 | End: 2023-10-18

## 2023-10-13 RX ORDER — BUPROPION HYDROCHLORIDE 150 MG/1
300 TABLET, EXTENDED RELEASE ORAL DAILY
Refills: 0 | Status: DISCONTINUED | OUTPATIENT
Start: 2023-10-13 | End: 2023-10-18

## 2023-10-13 RX ORDER — DIPHENHYDRAMINE HCL 50 MG
25 CAPSULE ORAL EVERY 4 HOURS
Refills: 0 | Status: DISCONTINUED | OUTPATIENT
Start: 2023-10-13 | End: 2023-10-18

## 2023-10-13 RX ORDER — ALBUTEROL 90 UG/1
2 AEROSOL, METERED ORAL EVERY 6 HOURS
Refills: 0 | Status: DISCONTINUED | OUTPATIENT
Start: 2023-10-13 | End: 2023-10-18

## 2023-10-13 RX ORDER — OXYBUTYNIN CHLORIDE 5 MG
10 TABLET ORAL DAILY
Refills: 0 | Status: DISCONTINUED | OUTPATIENT
Start: 2023-10-13 | End: 2023-10-18

## 2023-10-13 RX ADMIN — Medication 25 MILLIGRAM(S): at 18:05

## 2023-10-13 RX ADMIN — Medication 30 MILLIGRAM(S): at 04:18

## 2023-10-13 RX ADMIN — OXYCODONE HYDROCHLORIDE 10 MILLIGRAM(S): 5 TABLET ORAL at 22:28

## 2023-10-13 RX ADMIN — SODIUM CHLORIDE 150 MILLILITER(S): 9 INJECTION, SOLUTION INTRAVENOUS at 01:57

## 2023-10-13 RX ADMIN — RIVAROXABAN 10 MILLIGRAM(S): KIT at 12:08

## 2023-10-13 RX ADMIN — OXYCODONE HYDROCHLORIDE 10 MILLIGRAM(S): 5 TABLET ORAL at 10:42

## 2023-10-13 RX ADMIN — OXYCODONE HYDROCHLORIDE 10 MILLIGRAM(S): 5 TABLET ORAL at 15:00

## 2023-10-13 RX ADMIN — OXYCODONE HYDROCHLORIDE 10 MILLIGRAM(S): 5 TABLET ORAL at 09:47

## 2023-10-13 RX ADMIN — Medication 1000 MILLIGRAM(S): at 05:50

## 2023-10-13 RX ADMIN — Medication 1000 MILLIGRAM(S): at 15:00

## 2023-10-13 RX ADMIN — Medication 1000 MILLIGRAM(S): at 06:50

## 2023-10-13 RX ADMIN — Medication 500 MILLIGRAM(S): at 18:05

## 2023-10-13 RX ADMIN — Medication 500 MILLIGRAM(S): at 05:51

## 2023-10-13 RX ADMIN — Medication 3 MILLIGRAM(S): at 21:40

## 2023-10-13 RX ADMIN — Medication 1000 MILLIGRAM(S): at 22:28

## 2023-10-13 RX ADMIN — Medication 1 TABLET(S): at 12:09

## 2023-10-13 RX ADMIN — SERTRALINE 25 MILLIGRAM(S): 25 TABLET, FILM COATED ORAL at 12:09

## 2023-10-13 RX ADMIN — Medication 10 MILLIGRAM(S): at 12:08

## 2023-10-13 RX ADMIN — OXYCODONE HYDROCHLORIDE 10 MILLIGRAM(S): 5 TABLET ORAL at 14:19

## 2023-10-13 RX ADMIN — BUPROPION HYDROCHLORIDE 300 MILLIGRAM(S): 150 TABLET, EXTENDED RELEASE ORAL at 12:08

## 2023-10-13 RX ADMIN — OXYCODONE HYDROCHLORIDE 10 MILLIGRAM(S): 5 TABLET ORAL at 21:28

## 2023-10-13 RX ADMIN — OXYCODONE HYDROCHLORIDE 10 MILLIGRAM(S): 5 TABLET ORAL at 01:53

## 2023-10-13 RX ADMIN — OXYCODONE HYDROCHLORIDE 10 MILLIGRAM(S): 5 TABLET ORAL at 02:50

## 2023-10-13 RX ADMIN — Medication 1000 MILLIGRAM(S): at 21:28

## 2023-10-13 RX ADMIN — ALBUTEROL 2.5 MILLIGRAM(S): 90 AEROSOL, METERED ORAL at 14:03

## 2023-10-13 RX ADMIN — Medication 1000 MILLIGRAM(S): at 14:19

## 2023-10-13 RX ADMIN — Medication 30 MILLIGRAM(S): at 04:40

## 2023-10-13 RX ADMIN — PANTOPRAZOLE SODIUM 40 MILLIGRAM(S): 20 TABLET, DELAYED RELEASE ORAL at 05:50

## 2023-10-13 RX ADMIN — OXYCODONE HYDROCHLORIDE 5 MILLIGRAM(S): 5 TABLET ORAL at 05:49

## 2023-10-13 RX ADMIN — OXYCODONE HYDROCHLORIDE 5 MILLIGRAM(S): 5 TABLET ORAL at 06:50

## 2023-10-13 RX ADMIN — Medication 102 MILLIGRAM(S): at 05:49

## 2023-10-13 RX ADMIN — Medication 100 MILLIGRAM(S): at 05:50

## 2023-10-13 NOTE — OCCUPATIONAL THERAPY INITIAL EVALUATION ADULT - RANGE OF MOTION, PT EVAL
Pt will have WFL R Hip AROM (within posterior approach precautions) in order to perform mobility independently within 4 weeks.

## 2023-10-13 NOTE — PHYSICAL THERAPY INITIAL EVALUATION ADULT - ACTIVE RANGE OF MOTION EXAMINATION, REHAB EVAL
R hip within posterior precautions/abduction precautions, in brace/bilateral upper extremity Active ROM was WFL (within functional limits)/bilateral  lower extremity Active ROM was WFL (within functional limits)/deficits as listed below

## 2023-10-13 NOTE — OCCUPATIONAL THERAPY INITIAL EVALUATION ADULT - PRECAUTIONS/LIMITATIONS, REHAB EVAL
abduction precautions. Pt has to wear abduction brace at the following settings 45 degrees hip flexion/15 degrees abduction./fall precautions/right hip precautions

## 2023-10-13 NOTE — OCCUPATIONAL THERAPY INITIAL EVALUATION ADULT - RANGE OF MOTION EXAMINATION, LOWER EXTREMITY
RLE AROM hip flexion grossly impaired; RLE AROM distally to hip grossly WFL./Left LE Active ROM was WFL (within functional limits)

## 2023-10-13 NOTE — PHYSICAL THERAPY INITIAL EVALUATION ADULT - BALANCE TRAINING, PT EVAL
contact guard
Patient will be normal in static and dynamic standing balance with rolling walker in 2-3 weeks to improve safety and decrease risk of falls.

## 2023-10-13 NOTE — PHYSICAL THERAPY INITIAL EVALUATION ADULT - NSPTDMEREC_GEN_A_CORE
SUBJECTIVE:   Zahida Cooper is a 14 year old female, here for a routine health maintenance visit,   accompanied by her self.    Patient was roomed by: Olivia Smith MA    Do you have any forms to be completed?  no    SOCIAL HISTORY  Family members in house: mother, sister and brother  Language(s) spoken at home: English  Recent family changes/social stressors: none noted    SAFETY/HEALTH RISKS  TB exposure:  No  Cardiac risk assessment:     Family history (males <55, females <65) of angina (chest pain), heart attack, heart surgery for clogged arteries, or stroke: no    Biological parent(s) with a total cholesterol over 240:  no    DENTAL  Dental health HIGH risk factors: none  Water source:  city water and FILTERED WATER    SPORTS QUESTIONNAIRE:  ======================     1.NO - Has a doctor ever denied or restricted your participation in sports for any reason or told you to give up sports?   1. no - Has a doctor ever denied or restricted your participation in sports for any reason or told you to give up sports?  2. no - Do you have an ongoing medical condition (like diabetes,asthma, anemia, infections)?    3. no - Are you currently taking any prescription or nonprescription (over-the-counter) medicines or pills?    4. no - Do you have allergies to medicines, pollens, foods or stinging insects?    5. no - Have you ever spent a night in a hospital?   6. no - Have you ever had surgery?   7. no - Have you ever passed out or nearly passed out DURING exercise?   8. no - Have you ever passed out or nearly passed out AFTER exercise?   9. no - Have you ever had discomfort, pain, tightness, or pressure in your chest during exercise?   10.. yes - Does your heart race or skip beats (irregular beats) during exercise?   11. no - Has a doctor ever told you that you have High Blood Pressure, a Heart Murmur, High Cholesterol, a Heart Infection, Rheumatic Fever or Kawasaki's Disease?    12. no - Has a doctor ever  ordered a test for your heart? (example, ECG/EKG, Echocardiogram, stress test)  13. yes -Do you get lightheaded or feel more short of breath than expected during exercise?   14. no- Have you ever had an unexplained seizure?   15. yes -  Do you get tired or short of breath more quickly than your friends do during exercise?    16. no- Has any family member or relative  of heart problems or had an unexpected or unexplained sudden death before age 50 (including unexplained drowning, unexplained car accident or sudden infant death syndrome)?  17. no - Does anyone in your family have hypertrophic cardiomyopathy, Marfan syndrome, arrhythmogenic right ventricular cardiomyopathy, long QT syndrome, short QT syndrome, Brugada syndrome, or catecholaminergic polymorphic ventricular tachycardia?  18. no - Does anyone in your family have a heart problem, pacemaker, or implanted defibrillator?  19.no- Has anyone in your family had an unexplained fainting, unexplained seizures, or near drowning ?   20. no - Have you ever had an injury, like a sprain, muscle or ligament tear or tendonitis, that caused you to miss a practice or game?   21. no - Have you had any broken or fractured bones, or dislocated joints?   22. no - Have you had an injury that required x-rays, MRI, CT, surgery, injections, therapy, a brace, a cast, or crutches?    23. no - Have you ever had a stress fracture?   24. no - Have you ever been told that you have or have you had an x-ray for neck instability or atlantoaxial instability? (Down syndrome or dwarfism)  25. no - Do you regularly use a brace, orthotics or other assistive device?    26. no -Do you have a bone, muscle or joint injury that bothers you ?  27. no- Do any of your joints become painful, swollen, feel warm or look red?   28. no- Do you have a history of juvenile arthritis or connective tissue disease?   29. no - Has a doctor ever told you that you have asthma or allergies?   30. yes - Do you  cough, wheeze, have chest tightness, or have difficulty breathing during or after exercise?    31. no - Is there anyone in your family who has asthma?    32. no - Have you ever used an inhaler or taken asthma medicine?   33. no - Do you develop a rash or hives when you exercise?   34. no - Were you born without or are you missing a kidney, an eye, a testicle (males), or any other organ?  35. no- Do you have groin pain or a painful bulge or hernia in the groin area?   36. no - Have you had infectious mononucleosis (mono) within the last month?   37. no - Do you have any rashes, pressure sores, or other skin problems?   38. no - Have you had a herpes or MRSA  skin infection?   39. no - Have you ever had a head injury or concussion?   40. no - Have you ever had a hit or blow to the head that caused confusion, prolonged headaches or memory problems?    41. no - Do you have a history of seizure disorder?    42. no - Do you have headaches with exercise?   43. yes - Have you ever had numbness, tingling or weakness in your arms or legs after being hit or falling?   44. no - Have you ever been unable to move your arms or legs after being hit or falling?   45. no - Have you ever become ill when exercising in the heat?    46. yes -Do you get frequent muscle cramps when exercising?   47. no - Do you or someone in your family have sickle cell trait or disease?   48. yes - Have you had any problems with your eyes or vision?   49. no- Have you had any eye injuries?   50. yes - Do you wear glasses or contact lenses?    51. yes - Do you wear protective eyewear, such as goggles or a face shield?  52. yes - Do you worry about your weight?    53. yes - Are you trying to or has anyone recommended that you gain or lose weight?    54. no - Are you on a special diet or do you avoid certain types of foods?   55. no - Have you ever had an eating disorder?  56. no - Do you have any concerns that you would like to discuss with a doctor?   57.  "YES - Have you ever had a menstrual period?  58. How old were you when you had your first menstrual period? 9-10   59. How many menstrual periods have you had in the last year? 12    VISION   Wears glasses: NOT worn for testing  Tool used: HOTV  Right eye: 10/16 (20/32)   Left eye: 10/40 (20/80)  Two Line Difference: YES  Visual Acuity: REFER  Vision Assessment: abnormal-- patient was not wearing her glasses      HEARING  Right Ear:      1000 Hz RESPONSE- on Level:   20 db  (Conditioning sound)   1000 Hz: RESPONSE- on Level:   20 db    2000 Hz: RESPONSE- on Level:   20 db    4000 Hz: RESPONSE- on Level:   20 db    6000 Hz: RESPONSE- on Level:   20 db     Left Ear:      6000 Hz: RESPONSE- on Level:   20 db    4000 Hz: RESPONSE- on Level:   20 db    2000 Hz: RESPONSE- on Level:   20 db    1000 Hz: RESPONSE- on Level:   20 db      500 Hz: RESPONSE- on Level:   20 db     Right Ear:       500 Hz: RESPONSE- on Level: 30 db    Hearing Acuity: REFER    Hearing Assessment: normal    QUESTIONS/CONCERNS: None      MENSTRUAL HISTORY  LMP mid April  Menarche 8 yo  Regular, every month, lasts 4-5 days, 2-3 pads a day on heavy day       ROS  GENERAL: See health history, nutrition and daily activities   SKIN: No  rash, hives or significant lesions  HEENT: Hearing/vision: see above.  No eye, nasal, ear symptoms.  RESP: No cough or other concerns  CV: No concerns  GI: See nutrition and elimination.  No concerns.  : See elimination. No concerns  NEURO: No headaches or concerns.    OBJECTIVE:   EXAM  /77 (BP Location: Left arm, Patient Position: Chair, Cuff Size: Adult Regular)  Pulse 78  Temp 97.7  F (36.5  C) (Tympanic)  Ht 5' 4.95\" (1.65 m)  Wt 165 lb 3.2 oz (74.9 kg)  LMP 04/15/2018 (Exact Date)  SpO2 99%  BMI 27.53 kg/m2  64 %ile based on CDC 2-20 Years stature-for-age data using vitals from 5/10/2018.  93 %ile based on CDC 2-20 Years weight-for-age data using vitals from 5/10/2018.  93 %ile based on CDC 2-20 Years " BMI-for-age data using vitals from 5/10/2018.  Blood pressure percentiles are 63.6 % systolic and 82.7 % diastolic based on NHBPEP's 4th Report.   GENERAL: Active, alert, in no acute distress.  SKIN: Clear. No significant rash, abnormal pigmentation or lesions  HEAD: Normocephalic  EYES: Pupils equal, round, reactive, Extraocular muscles intact. Normal conjunctivae.  EARS: Normal canals. Tympanic membranes are normal; gray and translucent.  NOSE: Normal without discharge.  MOUTH/THROAT: Clear. No oral lesions. Teeth without obvious abnormalities.  NECK: Supple, no masses.  No thyromegaly.  LYMPH NODES: No adenopathy  LUNGS: Clear. No rales, rhonchi, wheezing or retractions  HEART: Regular rhythm. Normal S1/S2. No murmurs. Normal pulses.  ABDOMEN: Soft, non-tender, not distended, no masses or hepatosplenomegaly. Bowel sounds normal.   NEUROLOGIC: No focal findings. Cranial nerves grossly intact: DTR's normal. Normal gait, strength and tone  BACK: Spine is straight, no scoliosis.  EXTREMITIES: Full range of motion,flat feet bilaterally  : Exam deferred.    ASSESSMENT/PLAN:   1. Encounter for routine child health examination w/o abnormal findings  - will check hemoglobin today  - PURE TONE HEARING TEST, AIR  - SCREENING, VISUAL ACUITY, QUANTITATIVE, BILAT  - BEHAVIORAL / EMOTIONAL ASSESSMENT [82141]  - HUMAN PAPILLOMA VIRUS (GARDASIL 9) VACCINE [23529]  - Hemoglobin  - Cholesterol    2. Need for vaccination    - HUMAN PAPILLOMA VIRUS (GARDASIL 9) VACCINE [32012]    3. Child at risk for developing overweight body mass index (BMI) greater than 85th percentile  Counseled about diet and exercise, life style modification  Will check cholesterol today    4. Flat feet bilaterally  -Counseled about wearing shoes with good arch support and if necessary inserts for arch support  -if pain or any worsening patient states that will call for Podiatry referral      Anticipatory Guidance  The following topics were  discussed:  SOCIAL/ FAMILY:    Peer pressure    Bullying    Increased responsibility    Parent/ teen communication    TV/ media    School/ homework    Future plans/ College  NUTRITION:    Healthy food choices    Calcium     Vitamins/ supplements  HEALTH / SAFETY:    Adequate sleep/ exercise    Dental care    Drugs, ETOH, smoking    Seat belts    Bike/ sport helmets    Teen   SEXUALITY:    Preventive Care Plan  Immunizations    Reviewed, up to date  Referrals/Ongoing Specialty care: No   See other orders in EpicCare.  Cleared for sports:  Yes  BMI at 93 %ile based on CDC 2-20 Years BMI-for-age data using vitals from 5/10/2018.  At risk for overweight counseleded about weight management and will check cholesterol today  Dyslipidemia risk:    None, denies any Fhx  Dental visit recommended: Yes does not remember last time she has seen a dentist      FOLLOW-UP:    in 1 year for a Preventive Care visit    Resources  HPV and Cancer Prevention:  What Parents Should Know  What Kids Should Know About HPV and Cancer  Goal Tracker: Be More Active  Goal Tracker: Less Screen Time  Goal Tracker: Drink More Water  Goal Tracker: Eat More Fruits and Veggies    Karin Quintana MD  [facility]   Patient has rolling walker and 3:1 commode.

## 2023-10-13 NOTE — OCCUPATIONAL THERAPY INITIAL EVALUATION ADULT - LEVEL OF INDEPENDENCE: BED TO CHAIR, REHAB EVAL
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  Patient was given sedation.  Bilateral neck and chest were sterilely prepped and draped.  The patient was put in Trendelenburg position.  Large gauge needle was used to percutaneously access the right subclavian vein.   A guide wire was placed through the needle and into the venous system without any issue.  Under fluoroscopy, the guide wire was seen in the venous system.  The patient was put back in the flat position.   An incision was made at the percutaneous stick site and a subcutaneous pocket was made inferiorly.  Under Seldinger technique a venous sheath was placed in into the venous system over the guide wire.  The catheter was then placed into the venous system through the sheath.   The sheath was then peeled away and discarded.   Under fluoroscopy, the catheter tip was positioned into the superior vena cava.  The catheter was cut to size.   The port was attached to the catheter.  The port was accessed and it flushed and aspirated freely.  The port was then placed into the subcutaneous pocket and sutured to the chest wall.  The incision was then closed in 2 layers, first with a deep dermal layer of Vicryl followed by a subcuticular 4-0 Monocryl to close the skin.  After that, procedure was finished.   Patient was transferred to the recovery room in stable condition.  Chest x-ray will be performed in the recovery room.    Estimated Blood Loss: 5  Estimated Blood Loss has been documented.   Complications none  Instrument counts correct         Specimens:   Specimen (24h ago, onward)            None          [id number]     minimum assist (75% patients effort)

## 2023-10-13 NOTE — OCCUPATIONAL THERAPY INITIAL EVALUATION ADULT - GENERAL OBSERVATIONS, REHAB EVAL
Pt was encountered supine in bed with pts family present; NAD, s/p Revision R JOHN/abductor repair POD#1, R hip dressing clean, dry and intact, abductor wedge +, primafit +, pneumatic TEDS, alert, cooperative, followed commands; pt c/o pain in R hip which limits pt performance with functional ADL's/transfers and mobility.

## 2023-10-13 NOTE — OCCUPATIONAL THERAPY INITIAL EVALUATION ADULT - ADDITIONAL COMMENTS
As per pre-op and reviewed with patient POD #1: Pt lives alone in a senior housing apartment with 3 steps with close suraj rails to enter. Once inside, the pt apartment is on that floor. Pt has a tub/shower combo c fixed/retractable shower head, grab bar, and standard toilet with adequate space to fit a commode over it. Pt is on out-pt physical therapy 2/wk at present. Pt reported fell about 2 months ago and dislocated her R hip. Pt wears glasses for reading and no need for hearing aid. Pt is R handed and drives.

## 2023-10-13 NOTE — OCCUPATIONAL THERAPY INITIAL EVALUATION ADULT - STRENGTHENING, PT EVAL
Pt will increase BLE strength to 5/5 to improve functional strength needed to engage in functional tasks by 4 weeks

## 2023-10-14 LAB
ANION GAP SERPL CALC-SCNC: 5 MMOL/L — SIGNIFICANT CHANGE UP (ref 5–17)
BUN SERPL-MCNC: 23 MG/DL — SIGNIFICANT CHANGE UP (ref 7–23)
CALCIUM SERPL-MCNC: 8.3 MG/DL — LOW (ref 8.5–10.1)
CHLORIDE SERPL-SCNC: 107 MMOL/L — SIGNIFICANT CHANGE UP (ref 96–108)
CO2 SERPL-SCNC: 27 MMOL/L — SIGNIFICANT CHANGE UP (ref 22–31)
CREAT SERPL-MCNC: 0.76 MG/DL — SIGNIFICANT CHANGE UP (ref 0.5–1.3)
EGFR: 82 ML/MIN/1.73M2 — SIGNIFICANT CHANGE UP
GLUCOSE SERPL-MCNC: 93 MG/DL — SIGNIFICANT CHANGE UP (ref 70–99)
HCT VFR BLD CALC: 28 % — LOW (ref 34.5–45)
HGB BLD-MCNC: 8.9 G/DL — LOW (ref 11.5–15.5)
MCHC RBC-ENTMCNC: 29 PG — SIGNIFICANT CHANGE UP (ref 27–34)
MCHC RBC-ENTMCNC: 31.8 G/DL — LOW (ref 32–36)
MCV RBC AUTO: 91.2 FL — SIGNIFICANT CHANGE UP (ref 80–100)
NRBC # BLD: 0 /100 WBCS — SIGNIFICANT CHANGE UP (ref 0–0)
PLATELET # BLD AUTO: 179 K/UL — SIGNIFICANT CHANGE UP (ref 150–400)
POTASSIUM SERPL-MCNC: 4.2 MMOL/L — SIGNIFICANT CHANGE UP (ref 3.5–5.3)
POTASSIUM SERPL-SCNC: 4.2 MMOL/L — SIGNIFICANT CHANGE UP (ref 3.5–5.3)
RBC # BLD: 3.07 M/UL — LOW (ref 3.8–5.2)
RBC # FLD: 12.9 % — SIGNIFICANT CHANGE UP (ref 10.3–14.5)
SODIUM SERPL-SCNC: 139 MMOL/L — SIGNIFICANT CHANGE UP (ref 135–145)
WBC # BLD: 7 K/UL — SIGNIFICANT CHANGE UP (ref 3.8–10.5)
WBC # FLD AUTO: 7 K/UL — SIGNIFICANT CHANGE UP (ref 3.8–10.5)

## 2023-10-14 RX ADMIN — PANTOPRAZOLE SODIUM 40 MILLIGRAM(S): 20 TABLET, DELAYED RELEASE ORAL at 06:12

## 2023-10-14 RX ADMIN — Medication 500 MILLIGRAM(S): at 06:11

## 2023-10-14 RX ADMIN — OXYCODONE HYDROCHLORIDE 10 MILLIGRAM(S): 5 TABLET ORAL at 13:30

## 2023-10-14 RX ADMIN — Medication 3 MILLIGRAM(S): at 21:26

## 2023-10-14 RX ADMIN — Medication 10 MILLIGRAM(S): at 11:29

## 2023-10-14 RX ADMIN — Medication 1000 MILLIGRAM(S): at 21:26

## 2023-10-14 RX ADMIN — Medication 500 MILLIGRAM(S): at 17:05

## 2023-10-14 RX ADMIN — BUPROPION HYDROCHLORIDE 300 MILLIGRAM(S): 150 TABLET, EXTENDED RELEASE ORAL at 11:29

## 2023-10-14 RX ADMIN — Medication 1000 MILLIGRAM(S): at 22:26

## 2023-10-14 RX ADMIN — OXYCODONE HYDROCHLORIDE 10 MILLIGRAM(S): 5 TABLET ORAL at 06:52

## 2023-10-14 RX ADMIN — Medication 1 TABLET(S): at 11:30

## 2023-10-14 RX ADMIN — RIVAROXABAN 10 MILLIGRAM(S): KIT at 11:29

## 2023-10-14 RX ADMIN — OXYCODONE HYDROCHLORIDE 10 MILLIGRAM(S): 5 TABLET ORAL at 21:26

## 2023-10-14 RX ADMIN — OXYCODONE HYDROCHLORIDE 10 MILLIGRAM(S): 5 TABLET ORAL at 12:49

## 2023-10-14 RX ADMIN — Medication 1000 MILLIGRAM(S): at 06:52

## 2023-10-14 RX ADMIN — Medication 1000 MILLIGRAM(S): at 06:12

## 2023-10-14 RX ADMIN — OXYCODONE HYDROCHLORIDE 10 MILLIGRAM(S): 5 TABLET ORAL at 22:26

## 2023-10-14 RX ADMIN — OXYCODONE HYDROCHLORIDE 10 MILLIGRAM(S): 5 TABLET ORAL at 06:11

## 2023-10-14 RX ADMIN — SERTRALINE 25 MILLIGRAM(S): 25 TABLET, FILM COATED ORAL at 11:29

## 2023-10-14 RX ADMIN — Medication 1000 MILLIGRAM(S): at 12:49

## 2023-10-14 RX ADMIN — Medication 1000 MILLIGRAM(S): at 13:30

## 2023-10-14 RX ADMIN — SENNA PLUS 2 TABLET(S): 8.6 TABLET ORAL at 21:26

## 2023-10-15 LAB
ANION GAP SERPL CALC-SCNC: 3 MMOL/L — LOW (ref 5–17)
BUN SERPL-MCNC: 20 MG/DL — SIGNIFICANT CHANGE UP (ref 7–23)
CALCIUM SERPL-MCNC: 8.3 MG/DL — LOW (ref 8.5–10.1)
CHLORIDE SERPL-SCNC: 105 MMOL/L — SIGNIFICANT CHANGE UP (ref 96–108)
CO2 SERPL-SCNC: 29 MMOL/L — SIGNIFICANT CHANGE UP (ref 22–31)
CREAT SERPL-MCNC: 0.68 MG/DL — SIGNIFICANT CHANGE UP (ref 0.5–1.3)
EGFR: 91 ML/MIN/1.73M2 — SIGNIFICANT CHANGE UP
GLUCOSE SERPL-MCNC: 97 MG/DL — SIGNIFICANT CHANGE UP (ref 70–99)
HCT VFR BLD CALC: 28.5 % — LOW (ref 34.5–45)
HGB BLD-MCNC: 8.6 G/DL — LOW (ref 11.5–15.5)
MCHC RBC-ENTMCNC: 27.7 PG — SIGNIFICANT CHANGE UP (ref 27–34)
MCHC RBC-ENTMCNC: 30.2 G/DL — LOW (ref 32–36)
MCV RBC AUTO: 91.9 FL — SIGNIFICANT CHANGE UP (ref 80–100)
NRBC # BLD: 0 /100 WBCS — SIGNIFICANT CHANGE UP (ref 0–0)
PLATELET # BLD AUTO: 182 K/UL — SIGNIFICANT CHANGE UP (ref 150–400)
POTASSIUM SERPL-MCNC: 4.7 MMOL/L — SIGNIFICANT CHANGE UP (ref 3.5–5.3)
POTASSIUM SERPL-SCNC: 4.7 MMOL/L — SIGNIFICANT CHANGE UP (ref 3.5–5.3)
RBC # BLD: 3.1 M/UL — LOW (ref 3.8–5.2)
RBC # FLD: 13.2 % — SIGNIFICANT CHANGE UP (ref 10.3–14.5)
SODIUM SERPL-SCNC: 137 MMOL/L — SIGNIFICANT CHANGE UP (ref 135–145)
WBC # BLD: 7.78 K/UL — SIGNIFICANT CHANGE UP (ref 3.8–10.5)
WBC # FLD AUTO: 7.78 K/UL — SIGNIFICANT CHANGE UP (ref 3.8–10.5)

## 2023-10-15 RX ADMIN — OXYCODONE HYDROCHLORIDE 10 MILLIGRAM(S): 5 TABLET ORAL at 17:54

## 2023-10-15 RX ADMIN — OXYCODONE HYDROCHLORIDE 10 MILLIGRAM(S): 5 TABLET ORAL at 10:03

## 2023-10-15 RX ADMIN — OXYCODONE HYDROCHLORIDE 10 MILLIGRAM(S): 5 TABLET ORAL at 13:46

## 2023-10-15 RX ADMIN — Medication 1000 MILLIGRAM(S): at 22:33

## 2023-10-15 RX ADMIN — RIVAROXABAN 10 MILLIGRAM(S): KIT at 11:15

## 2023-10-15 RX ADMIN — Medication 10 MILLIGRAM(S): at 11:16

## 2023-10-15 RX ADMIN — Medication 1 TABLET(S): at 11:15

## 2023-10-15 RX ADMIN — Medication 3 MILLIGRAM(S): at 21:33

## 2023-10-15 RX ADMIN — POLYETHYLENE GLYCOL 3350 17 GRAM(S): 17 POWDER, FOR SOLUTION ORAL at 21:32

## 2023-10-15 RX ADMIN — OXYCODONE HYDROCHLORIDE 10 MILLIGRAM(S): 5 TABLET ORAL at 11:00

## 2023-10-15 RX ADMIN — Medication 1000 MILLIGRAM(S): at 06:56

## 2023-10-15 RX ADMIN — PANTOPRAZOLE SODIUM 40 MILLIGRAM(S): 20 TABLET, DELAYED RELEASE ORAL at 06:32

## 2023-10-15 RX ADMIN — OXYCODONE HYDROCHLORIDE 10 MILLIGRAM(S): 5 TABLET ORAL at 14:57

## 2023-10-15 RX ADMIN — Medication 1000 MILLIGRAM(S): at 13:46

## 2023-10-15 RX ADMIN — Medication 1000 MILLIGRAM(S): at 06:32

## 2023-10-15 RX ADMIN — Medication 1000 MILLIGRAM(S): at 21:33

## 2023-10-15 RX ADMIN — Medication 500 MILLIGRAM(S): at 06:32

## 2023-10-15 RX ADMIN — BUPROPION HYDROCHLORIDE 300 MILLIGRAM(S): 150 TABLET, EXTENDED RELEASE ORAL at 11:16

## 2023-10-15 RX ADMIN — SENNA PLUS 2 TABLET(S): 8.6 TABLET ORAL at 21:32

## 2023-10-15 RX ADMIN — MAGNESIUM HYDROXIDE 30 MILLILITER(S): 400 TABLET, CHEWABLE ORAL at 10:04

## 2023-10-15 RX ADMIN — Medication 500 MILLIGRAM(S): at 17:11

## 2023-10-15 RX ADMIN — SERTRALINE 25 MILLIGRAM(S): 25 TABLET, FILM COATED ORAL at 11:16

## 2023-10-15 RX ADMIN — Medication 1000 MILLIGRAM(S): at 14:57

## 2023-10-15 RX ADMIN — OXYCODONE HYDROCHLORIDE 10 MILLIGRAM(S): 5 TABLET ORAL at 18:30

## 2023-10-16 ENCOUNTER — TRANSCRIPTION ENCOUNTER (OUTPATIENT)
Age: 73
End: 2023-10-16

## 2023-10-16 LAB
ANION GAP SERPL CALC-SCNC: 1 MMOL/L — LOW (ref 5–17)
BUN SERPL-MCNC: 17 MG/DL — SIGNIFICANT CHANGE UP (ref 7–23)
CALCIUM SERPL-MCNC: 8.5 MG/DL — SIGNIFICANT CHANGE UP (ref 8.5–10.1)
CHLORIDE SERPL-SCNC: 105 MMOL/L — SIGNIFICANT CHANGE UP (ref 96–108)
CO2 SERPL-SCNC: 31 MMOL/L — SIGNIFICANT CHANGE UP (ref 22–31)
CREAT SERPL-MCNC: 0.62 MG/DL — SIGNIFICANT CHANGE UP (ref 0.5–1.3)
EGFR: 93 ML/MIN/1.73M2 — SIGNIFICANT CHANGE UP
FLUAV AG NPH QL: SIGNIFICANT CHANGE UP
FLUBV AG NPH QL: SIGNIFICANT CHANGE UP
GLUCOSE SERPL-MCNC: 106 MG/DL — HIGH (ref 70–99)
HCT VFR BLD CALC: 26.8 % — LOW (ref 34.5–45)
HGB BLD-MCNC: 8.3 G/DL — LOW (ref 11.5–15.5)
MCHC RBC-ENTMCNC: 28.3 PG — SIGNIFICANT CHANGE UP (ref 27–34)
MCHC RBC-ENTMCNC: 31 G/DL — LOW (ref 32–36)
MCV RBC AUTO: 91.5 FL — SIGNIFICANT CHANGE UP (ref 80–100)
NRBC # BLD: 0 /100 WBCS — SIGNIFICANT CHANGE UP (ref 0–0)
PLATELET # BLD AUTO: 210 K/UL — SIGNIFICANT CHANGE UP (ref 150–400)
POTASSIUM SERPL-MCNC: 4.5 MMOL/L — SIGNIFICANT CHANGE UP (ref 3.5–5.3)
POTASSIUM SERPL-SCNC: 4.5 MMOL/L — SIGNIFICANT CHANGE UP (ref 3.5–5.3)
RBC # BLD: 2.93 M/UL — LOW (ref 3.8–5.2)
RBC # FLD: 13.1 % — SIGNIFICANT CHANGE UP (ref 10.3–14.5)
SARS-COV-2 RNA SPEC QL NAA+PROBE: SIGNIFICANT CHANGE UP
SODIUM SERPL-SCNC: 137 MMOL/L — SIGNIFICANT CHANGE UP (ref 135–145)
SURGICAL PATHOLOGY STUDY: SIGNIFICANT CHANGE UP
WBC # BLD: 7.5 K/UL — SIGNIFICANT CHANGE UP (ref 3.8–10.5)
WBC # FLD AUTO: 7.5 K/UL — SIGNIFICANT CHANGE UP (ref 3.8–10.5)

## 2023-10-16 RX ORDER — OXYCODONE HYDROCHLORIDE 5 MG/1
1 TABLET ORAL
Qty: 0 | Refills: 0 | DISCHARGE
Start: 2023-10-16

## 2023-10-16 RX ORDER — ASCORBIC ACID 60 MG
1 TABLET,CHEWABLE ORAL
Qty: 0 | Refills: 0 | DISCHARGE
Start: 2023-10-16

## 2023-10-16 RX ORDER — RIVAROXABAN 15 MG-20MG
1 KIT ORAL
Qty: 0 | Refills: 0 | DISCHARGE
Start: 2023-10-16

## 2023-10-16 RX ORDER — PANTOPRAZOLE SODIUM 20 MG/1
1 TABLET, DELAYED RELEASE ORAL
Qty: 0 | Refills: 0 | DISCHARGE
Start: 2023-10-16

## 2023-10-16 RX ORDER — IBUPROFEN 200 MG
1 TABLET ORAL
Refills: 0 | DISCHARGE

## 2023-10-16 RX ORDER — SENNA PLUS 8.6 MG/1
2 TABLET ORAL
Qty: 0 | Refills: 0 | DISCHARGE
Start: 2023-10-16

## 2023-10-16 RX ADMIN — PANTOPRAZOLE SODIUM 40 MILLIGRAM(S): 20 TABLET, DELAYED RELEASE ORAL at 05:33

## 2023-10-16 RX ADMIN — Medication 10 MILLIGRAM(S): at 11:28

## 2023-10-16 RX ADMIN — OXYCODONE HYDROCHLORIDE 10 MILLIGRAM(S): 5 TABLET ORAL at 11:28

## 2023-10-16 RX ADMIN — Medication 1000 MILLIGRAM(S): at 05:33

## 2023-10-16 RX ADMIN — BUPROPION HYDROCHLORIDE 300 MILLIGRAM(S): 150 TABLET, EXTENDED RELEASE ORAL at 11:29

## 2023-10-16 RX ADMIN — Medication 1000 MILLIGRAM(S): at 06:32

## 2023-10-16 RX ADMIN — OXYCODONE HYDROCHLORIDE 10 MILLIGRAM(S): 5 TABLET ORAL at 22:07

## 2023-10-16 RX ADMIN — OXYCODONE HYDROCHLORIDE 10 MILLIGRAM(S): 5 TABLET ORAL at 12:16

## 2023-10-16 RX ADMIN — Medication 1 TABLET(S): at 11:28

## 2023-10-16 RX ADMIN — RIVAROXABAN 10 MILLIGRAM(S): KIT at 11:28

## 2023-10-16 RX ADMIN — SERTRALINE 25 MILLIGRAM(S): 25 TABLET, FILM COATED ORAL at 11:28

## 2023-10-16 RX ADMIN — SENNA PLUS 2 TABLET(S): 8.6 TABLET ORAL at 22:08

## 2023-10-16 RX ADMIN — OXYCODONE HYDROCHLORIDE 10 MILLIGRAM(S): 5 TABLET ORAL at 17:45

## 2023-10-16 RX ADMIN — OXYCODONE HYDROCHLORIDE 10 MILLIGRAM(S): 5 TABLET ORAL at 18:43

## 2023-10-16 RX ADMIN — Medication 1000 MILLIGRAM(S): at 18:43

## 2023-10-16 RX ADMIN — OXYCODONE HYDROCHLORIDE 10 MILLIGRAM(S): 5 TABLET ORAL at 06:32

## 2023-10-16 RX ADMIN — Medication 500 MILLIGRAM(S): at 17:45

## 2023-10-16 RX ADMIN — OXYCODONE HYDROCHLORIDE 10 MILLIGRAM(S): 5 TABLET ORAL at 23:00

## 2023-10-16 RX ADMIN — Medication 1000 MILLIGRAM(S): at 17:45

## 2023-10-16 RX ADMIN — OXYCODONE HYDROCHLORIDE 10 MILLIGRAM(S): 5 TABLET ORAL at 05:32

## 2023-10-16 RX ADMIN — Medication 500 MILLIGRAM(S): at 05:33

## 2023-10-16 NOTE — DISCHARGE NOTE PROVIDER - NSDCCPCAREPLAN_GEN_ALL_CORE_FT
PRINCIPAL DISCHARGE DIAGNOSIS  Diagnosis: Failure of total hip arthroplasty, unspecified laterality, sequela  Assessment and Plan of Treatment:

## 2023-10-16 NOTE — DISCHARGE NOTE PROVIDER - CARE PROVIDER_API CALL
Dewey Stinson.  Orthopaedic Surgery  [address] [facility] [facility], [address] [place] [id number]  Phone: [phone]  Fax: [phone]  Follow Up Time:

## 2023-10-16 NOTE — DISCHARGE NOTE PROVIDER - NSDCMRMEDTOKEN_GEN_ALL_CORE_FT
Albuterol (Eqv-ProAir HFA) 90 mcg/inh inhalation aerosol: 2 puff(s) inhaled once a day as needed for  cough  ascorbic acid 500 mg oral tablet: 1 tab(s) orally 2 times a day  buPROPion 300 mg/24 hours (XL) oral tablet, extended release: 1 tab(s) orally once a day  Ditropan XL 10 mg/24 hr oral tablet, extended release: 1 tab(s) orally once a day  Multiple Vitamins oral tablet: 1 tab(s) orally once a day  oxyCODONE 10 mg oral tablet: 1 tab(s) orally every 3 hours As needed Severe Pain (7 - 10)  oxyCODONE 5 mg oral tablet: 1 tab(s) orally every 3 hours As needed Moderate Pain (4 - 6)  Ozempic 4 mg/3 mL (1 mg dose) subcutaneous solution: 1 milligram(s) subcutaneously every 2 weeks  pantoprazole 40 mg oral delayed release tablet: 1 tab(s) orally once a day (before a meal)  rivaroxaban 10 mg oral tablet: 1 tab(s) orally once a day  senna leaf extract oral tablet: 2 tab(s) orally once a day (at bedtime)  sertraline 25 mg oral tablet: 1 tab(s) orally once a day

## 2023-10-16 NOTE — DISCHARGE NOTE PROVIDER - HOSPITAL COURSE
73yFemale with history of Right JOHN failure presenting for Revision R JOHN by Dr. Stinson on 10/12/23. Risk and benefits of surgery were explained to the patient. The patient understood and agreed to proceed with surgery. Patient underwent the procedure with no intraoperative complications. Pt was brought in stable condition to the PACU. Once stable in PACU, pt was brought to the floor. During hospital stay pt was followed by Medicine, physical therapy, Home Care during this admission. Pt is stable for discharge

## 2023-10-16 NOTE — DISCHARGE NOTE PROVIDER - NSDCFUADDAPPT_GEN_ALL_CORE_FT
Follow up with your surgeon in two weeks. Call for appointment.  If you need more pain medication, call your surgeon's office. For medication refills or authorizations, please call [phone] xt [phone]  We recommend that you call and schedule a follow up appointment within 2-4 weeks with your primary care physician for repeat blood work (CBC and BMP) for post hospital discharge follow-up care.  Call your surgeon if you have increased redness/pain/drainage or fever. Return to ER for shortness of breath/calf tenderness.

## 2023-10-16 NOTE — DISCHARGE NOTE PROVIDER - NSDCFUADDINST_GEN_ALL_CORE_FT
Hip abduction brace 90 flexion 15 abduction  Hip replacement precautions:  Elevate the leg (while keeping hip precautions) as often as possible to help control swelling. Incentive spirometer 10X/hr.  Keep Prineo Dressing Clean, Dry and Intact. May shower with Prineo Dressing. Please do not scrub, soak, peel or pick at the prineo dressing. No creams, lotions, or oils over dressing. May shower and let water run over incision, no baths. Pat dry once out of shower. Dressing to be removed in office at follow up visit in 2 weeks. There are no staples or stitches that need to be removed.  If you notice any redness, irritation, or itching around the prineo dressing call the surgeon's office  Per Dr. Stinson: may advance from walker as tolerated per discretion of physical therapist.   Hip abduction brace 90 flexion 15 abduction  25% PWB  Hip replacement precautions:  Elevate the leg (while keeping hip precautions) as often as possible to help control swelling. Incentive spirometer 10X/hr.  Keep Prineo Dressing Clean, Dry and Intact. May shower with Prineo Dressing. Please do not scrub, soak, peel or pick at the prineo dressing. No creams, lotions, or oils over dressing. May shower and let water run over incision, no baths. Pat dry once out of shower. Dressing to be removed in office at follow up visit in 2 weeks. There are no staples or stitches that need to be removed.  If you notice any redness, irritation, or itching around the prineo dressing call the surgeon's office  Per Dr. Stinson: may advance from walker as tolerated per discretion of physical therapist.

## 2023-10-16 NOTE — DISCHARGE NOTE PROVIDER - NSDCFUSCHEDAPPT_GEN_ALL_CORE_FT
Dewey Stinson  [facility] Physician [facility]  [place] [address]  Scheduled Appointment: 10/31/2023

## 2023-10-17 RX ADMIN — PANTOPRAZOLE SODIUM 40 MILLIGRAM(S): 20 TABLET, DELAYED RELEASE ORAL at 05:10

## 2023-10-17 RX ADMIN — BUPROPION HYDROCHLORIDE 300 MILLIGRAM(S): 150 TABLET, EXTENDED RELEASE ORAL at 12:19

## 2023-10-17 RX ADMIN — SERTRALINE 25 MILLIGRAM(S): 25 TABLET, FILM COATED ORAL at 12:19

## 2023-10-17 RX ADMIN — RIVAROXABAN 10 MILLIGRAM(S): KIT at 12:18

## 2023-10-17 RX ADMIN — Medication 1000 MILLIGRAM(S): at 06:10

## 2023-10-17 RX ADMIN — Medication 1000 MILLIGRAM(S): at 13:34

## 2023-10-17 RX ADMIN — SENNA PLUS 2 TABLET(S): 8.6 TABLET ORAL at 21:13

## 2023-10-17 RX ADMIN — OXYCODONE HYDROCHLORIDE 10 MILLIGRAM(S): 5 TABLET ORAL at 18:20

## 2023-10-17 RX ADMIN — Medication 1000 MILLIGRAM(S): at 22:13

## 2023-10-17 RX ADMIN — Medication 1000 MILLIGRAM(S): at 05:10

## 2023-10-17 RX ADMIN — Medication 1000 MILLIGRAM(S): at 21:13

## 2023-10-17 RX ADMIN — Medication 10 MILLIGRAM(S): at 12:18

## 2023-10-17 RX ADMIN — Medication 1000 MILLIGRAM(S): at 14:34

## 2023-10-17 RX ADMIN — OXYCODONE HYDROCHLORIDE 10 MILLIGRAM(S): 5 TABLET ORAL at 17:20

## 2023-10-17 RX ADMIN — OXYCODONE HYDROCHLORIDE 10 MILLIGRAM(S): 5 TABLET ORAL at 08:53

## 2023-10-17 RX ADMIN — Medication 500 MILLIGRAM(S): at 17:20

## 2023-10-17 RX ADMIN — Medication 500 MILLIGRAM(S): at 05:10

## 2023-10-17 RX ADMIN — Medication 1 TABLET(S): at 12:18

## 2023-10-17 RX ADMIN — OXYCODONE HYDROCHLORIDE 10 MILLIGRAM(S): 5 TABLET ORAL at 09:53

## 2023-10-18 ENCOUNTER — TRANSCRIPTION ENCOUNTER (OUTPATIENT)
Age: 73
End: 2023-10-18

## 2023-10-18 VITALS
DIASTOLIC BLOOD PRESSURE: 72 MMHG | SYSTOLIC BLOOD PRESSURE: 103 MMHG | RESPIRATION RATE: 16 BRPM | OXYGEN SATURATION: 96 % | TEMPERATURE: 98 F | HEART RATE: 74 BPM

## 2023-10-18 LAB
CULTURE RESULTS: SIGNIFICANT CHANGE UP
GRAM STN FLD: SIGNIFICANT CHANGE UP
SPECIMEN SOURCE: SIGNIFICANT CHANGE UP

## 2023-10-18 RX ADMIN — OXYCODONE HYDROCHLORIDE 10 MILLIGRAM(S): 5 TABLET ORAL at 09:48

## 2023-10-18 RX ADMIN — OXYCODONE HYDROCHLORIDE 10 MILLIGRAM(S): 5 TABLET ORAL at 06:27

## 2023-10-18 RX ADMIN — Medication 1000 MILLIGRAM(S): at 14:16

## 2023-10-18 RX ADMIN — PANTOPRAZOLE SODIUM 40 MILLIGRAM(S): 20 TABLET, DELAYED RELEASE ORAL at 05:23

## 2023-10-18 RX ADMIN — Medication 1 TABLET(S): at 12:55

## 2023-10-18 RX ADMIN — OXYCODONE HYDROCHLORIDE 10 MILLIGRAM(S): 5 TABLET ORAL at 14:02

## 2023-10-18 RX ADMIN — OXYCODONE HYDROCHLORIDE 10 MILLIGRAM(S): 5 TABLET ORAL at 05:27

## 2023-10-18 RX ADMIN — Medication 1000 MILLIGRAM(S): at 15:16

## 2023-10-18 RX ADMIN — Medication 10 MILLIGRAM(S): at 12:55

## 2023-10-18 RX ADMIN — RIVAROXABAN 10 MILLIGRAM(S): KIT at 12:56

## 2023-10-18 RX ADMIN — OXYCODONE HYDROCHLORIDE 10 MILLIGRAM(S): 5 TABLET ORAL at 15:02

## 2023-10-18 RX ADMIN — Medication 500 MILLIGRAM(S): at 05:23

## 2023-10-18 RX ADMIN — OXYCODONE HYDROCHLORIDE 10 MILLIGRAM(S): 5 TABLET ORAL at 10:48

## 2023-10-18 RX ADMIN — BUPROPION HYDROCHLORIDE 300 MILLIGRAM(S): 150 TABLET, EXTENDED RELEASE ORAL at 12:54

## 2023-10-18 RX ADMIN — Medication 1000 MILLIGRAM(S): at 05:22

## 2023-10-18 RX ADMIN — SERTRALINE 25 MILLIGRAM(S): 25 TABLET, FILM COATED ORAL at 12:55

## 2023-10-18 RX ADMIN — Medication 1000 MILLIGRAM(S): at 06:22

## 2023-10-18 NOTE — DISCHARGE NOTE NURSING/CASE MANAGEMENT/SOCIAL WORK - NSDCFUADDAPPT_GEN_ALL_CORE_FT
Follow up with your surgeon in two weeks. Call for appointment.  If you need more pain medication, call your surgeon's office. For medication refills or authorizations, please call [phone] xt [phone]  We recommend that you call and schedule a follow up appointment within 2-4 weeks with your primary care physician for repeat blood work (CBC and BMP) for post hospital discharge follow-up care.  Call your surgeon if you have increased redness/pain/drainage or fever. Return to ER for shortness of breath/calf tenderness.

## 2023-10-18 NOTE — DISCHARGE NOTE NURSING/CASE MANAGEMENT/SOCIAL WORK - PATIENT PORTAL LINK FT
You can access the FollowMyHealth Patient Portal offered by [facility] by registering at the following website: [email or web address]. By joining [employer]’s FollowMyHealth portal, you will also be able to view your health information using other applications (apps) compatible with our system.

## 2023-10-18 NOTE — PROGRESS NOTE ADULT - PROVIDER SPECIALTY LIST ADULT
Internal Medicine
Orthopedics
Internal Medicine
Orthopedics
Orthopedics

## 2023-10-18 NOTE — DISCHARGE NOTE NURSING/CASE MANAGEMENT/SOCIAL WORK - NSDCPEFALRISK_GEN_ALL_CORE
For information on Fall & Injury Prevention, visit: [email or web address] OR  [email or web address] OR  https://www.cdc.gov/steadi/patient.html

## 2023-10-18 NOTE — PROGRESS NOTE ADULT - SUBJECTIVE AND OBJECTIVE BOX
Patient is s/p Revision R JOHN/abductor repair POD #5  Patient seen and examined at bedside. Pain well controlled with medication. Patient denies any numbness, tingling, weakness, or any other orthopaedic complaint.     Vital Signs Last 24 Hrs  T(C): 36.9 (17 Oct 2023 09:38), Max: 37.6 (16 Oct 2023 22:02)  T(F): 98.4 (17 Oct 2023 09:38), Max: 99.7 (16 Oct 2023 22:02)  HR: 85 (17 Oct 2023 09:38) (71 - 92)  BP: 109/66 (17 Oct 2023 09:38) (106/65 - 114/69)  BP(mean): --  RR: 16 (17 Oct 2023 09:38) (16 - 18)  SpO2: 92% (17 Oct 2023 09:38) (92% - 95%)    Parameters below as of 17 Oct 2023 09:38  Patient On (Oxygen Delivery Method): room air          PE:  General: A&Ox3 NAD  RLE: Dressing C/D/I. abduction brace in place. Motor intact + EHL/FHL/TA/GS. Sensation is grossly intact. Extremity warm. Compartments soft, compressible, no calf tenderness. DP 2+   LLE: Motor intact + EHL/FHL/TA/GS. Sensation is grossly intact. Extremity warm. Compartments soft, compressible, no calf tenderness. DP 2+       A/P: Patient is a 73y y/o Female s/p R JOHN/abductor repiar POD#5  -wound care, isometric exercises, GI motility, new medications, hospital course reviewed with pt  -Pain control/analgesia  -Inc spirometry reviewed and counseled  -DVT ppx with venodynes and Xarelto  -F/U AM Labs  -PT/OT/PWB, Abductor precuations/Posterior hip precautions. Brace 90/15  -Antibiotic post op  -Medical consult  -Dispo to [facility] per PT     
Patient is s/p Revision R JOHN/abductor repiar POD#1  Pt tolerated procedure well without any intra-op complications.   Pt doing well at this time. Pain is controlled.   Denies CP/SOB/Dizziness/N/V/D/HA.     Vital Signs Last 24 Hrs  T(C): 36.9 (13 Oct 2023 09:30), Max: 37.3 (12 Oct 2023 12:58)  T(F): 98.4 (13 Oct 2023 09:30), Max: 99.2 (12 Oct 2023 12:58)  HR: 83 (13 Oct 2023 09:30) (68 - 97)  BP: 113/63 (13 Oct 2023 09:30) (106/64 - 143/79)  BP(mean): --  RR: 16 (13 Oct 2023 09:30) (12 - 18)  SpO2: 94% (13 Oct 2023 09:30) (92% - 100%)    Parameters below as of 13 Oct 2023 09:30  Patient On (Oxygen Delivery Method): room air        PE:  General: A&Ox3 NAD  RLE: Dressing C/D/I. abduction pillow in place. Motor intact + EHL/FHL/TA/GS. Sensation is grossly intact. Extremity warm. Compartments soft, compressible, no calf tenderness. DP 2+   LLE: Motor intact + EHL/FHL/TA/GS. Sensation is grossly intact. Extremity warm. Compartments soft, compressible, no calf tenderness. DP 2+     Labs:                          9.2    7.21  )-----------( 184      ( 13 Oct 2023 05:35 )             29.2       10-13    137  |  103  |  23  ----------------------------<  125<H>  4.6   |  27  |  0.82    Ca    8.1<L>      13 Oct 2023 05:35        A/P: Patient is a 73y y/o Female s/p R JOHN/abductor repiar POD#1  -wound care, isometric exercises, GI motility, new medications, hospital course reviewed with pt  -Pain control/analgesia  -Inc spirometry reviewed and counseled  -DVT ppx with venodynes and Xarelto  -F/U AM Labs  -PT/OT/PWB, Abductor precuations/Posterior hip precautions. Brace 45/15.  -Antibiotic post op  -Medical consult  -Discharge planning    
SO VIRAMONTES is a 73y Female s/p REVISION RIGHT TOTAL HIP ARTHROPLASTY        denies any chest pain shortness of breath palpitation dizziness lightheadedness nausea vomiting fever or chills    T(C): 36.9 (10-13-23 @ 09:30), Max: 37.3 (10-12-23 @ 12:58)  HR: 83 (10-13-23 @ 09:30) (68 - 97)  BP: 113/63 (10-13-23 @ 09:30) (106/64 - 143/79)  RR: 16 (10-13-23 @ 09:30) (12 - 18)  SpO2: 94% (10-13-23 @ 09:30) (92% - 100%)  no jvd/bruit  s1 s2 rrr  cta  s/nt/nd  no calf tend                        9.2    7.21  )-----------( 184      ( 13 Oct 2023 05:35 )             29.2   10-13    137  |  103  |  23  ----------------------------<  125<H>  4.6   |  27  |  0.82    Ca    8.1<L>      13 Oct 2023 05:35        cont dvt px  pain control  bowel regimen  antiemetics  incentive spirometer
Patient is s/p Revision R JOHN/abductor repair POD #3  Patient seen and examined at bedside. Pain well controlled with medication. Patient denies any numbness, tingling, weakness, or any other orthopaedic complaint.     Vital Signs Last 24 Hrs  T(C): 36.8 (15 Oct 2023 06:00), Max: 37 (14 Oct 2023 18:20)  T(F): 98.2 (15 Oct 2023 06:00), Max: 98.6 (14 Oct 2023 18:20)  HR: 75 (15 Oct 2023 06:00) (71 - 80)  BP: 114/66 (15 Oct 2023 06:00) (108/51 - 114/66)  BP(mean): --  RR: 18 (15 Oct 2023 06:00) (18 - 18)  SpO2: 95% (15 Oct 2023 06:00) (92% - 97%)    Parameters below as of 15 Oct 2023 06:00  Patient On (Oxygen Delivery Method): room air        PE:  General: A&Ox3 NAD  RLE: Dressing C/D/I. abduction brace in place. Motor intact + EHL/FHL/TA/GS. Sensation is grossly intact. Extremity warm. Compartments soft, compressible, no calf tenderness. DP 2+   LLE: Motor intact + EHL/FHL/TA/GS. Sensation is grossly intact. Extremity warm. Compartments soft, compressible, no calf tenderness. DP 2+       A/P: Patient is a 73y y/o Female s/p R JOHN/abductor repiar POD#3  -wound care, isometric exercises, GI motility, new medications, hospital course reviewed with pt  -Pain control/analgesia  -Inc spirometry reviewed and counseled  -DVT ppx with venodynes and Xarelto  -F/U AM Labs  -PT/OT/PWB, Abductor precuations/Posterior hip precautions. Brace 45/15.  -Antibiotic post op  -Medical consult  -Dspi to [facility] per PT     
Patient is s/p Revision R JOHN/abductor repair POD #4  Patient seen and examined at bedside. Pain well controlled with medication. Patient denies any numbness, tingling, weakness, or any other orthopaedic complaint.     Vital Signs Last 24 Hrs  T(C): 36.8 (16 Oct 2023 05:10), Max: 37 (15 Oct 2023 11:34)  T(F): 98.3 (16 Oct 2023 05:10), Max: 98.6 (15 Oct 2023 11:34)  HR: 67 (16 Oct 2023 05:10) (67 - 80)  BP: 112/63 (16 Oct 2023 05:10) (100/67 - 112/63)  BP(mean): --  RR: 18 (16 Oct 2023 05:10) (18 - 18)  SpO2: 93% (16 Oct 2023 05:10) (93% - 94%)    Parameters below as of 16 Oct 2023 05:10  Patient On (Oxygen Delivery Method): room air        PE:  General: A&Ox3 NAD  RLE: Dressing C/D/I. abduction brace in place. Motor intact + EHL/FHL/TA/GS. Sensation is grossly intact. Extremity warm. Compartments soft, compressible, no calf tenderness. DP 2+   LLE: Motor intact + EHL/FHL/TA/GS. Sensation is grossly intact. Extremity warm. Compartments soft, compressible, no calf tenderness. DP 2+       A/P: Patient is a 73y y/o Female s/p R JOHN/abductor repiar POD#4  -wound care, isometric exercises, GI motility, new medications, hospital course reviewed with pt  -Pain control/analgesia  -Inc spirometry reviewed and counseled  -DVT ppx with venodynes and Xarelto  -F/U AM Labs  -PT/OT/PWB, Abductor precuations/Posterior hip precautions. Brace 90/15  -Antibiotic post op  -Medical consult  -Dispo to [facility] per PT     
Patient is s/p Revision R JOHN/abductor repair POD #6  Patient seen and examined at bedside. Pain well controlled with medication. Patient denies any numbness, tingling, weakness, or any other orthopaedic complaint.     Vital Signs Last 24 Hrs  T(C): 36.9 (18 Oct 2023 05:00), Max: 37 (17 Oct 2023 17:49)  T(F): 98.4 (18 Oct 2023 05:00), Max: 98.6 (17 Oct 2023 17:49)  HR: 68 (18 Oct 2023 05:00) (68 - 88)  BP: 119/69 (18 Oct 2023 05:00) (104/63 - 119/69)  BP(mean): --  RR: 18 (18 Oct 2023 05:00) (16 - 18)  SpO2: 95% (18 Oct 2023 05:00) (93% - 95%)    Parameters below as of 18 Oct 2023 05:00  Patient On (Oxygen Delivery Method): room air        PE:  General: A&Ox3 NAD  RLE: Dressing C/D/I. abduction brace in place. Motor intact + EHL/FHL/TA/GS. Sensation is grossly intact. Extremity warm. Compartments soft, compressible, no calf tenderness. DP 2+   LLE: Motor intact + EHL/FHL/TA/GS. Sensation is grossly intact. Extremity warm. Compartments soft, compressible, no calf tenderness. DP 2+       A/P: Patient is a 73y y/o Female s/p R JOHN/abductor repiar POD#6  -wound care, isometric exercises, GI motility, new medications, hospital course reviewed with pt  -Pain control/analgesia  -Inc spirometry reviewed and counseled  -DVT ppx with venodynes and Xarelto  -F/U AM Labs  -PT/OT/PWB 25%, Abductor precuations/Posterior hip precautions. Brace 90/15  -Antibiotic post op  -Medical consult  -Dispo to [facility] per PT     
Patient is s/p Revision R JOHN/abductor repiar POD#2  Pt doing well at this time. Pain is controlled.   Denies CP/SOB/Dizziness/N/V/D/HA.     Vital Signs (24 Hrs):  T(C): 36.7 (10-14-23 @ 05:00), Max: 37.6 (10-13-23 @ 18:54)  HR: 71 (10-14-23 @ 05:00) (71 - 95)  BP: 116/61 (10-14-23 @ 05:00) (113/63 - 124/68)  RR: 17 (10-14-23 @ 05:00) (16 - 18)  SpO2: 98% (10-14-23 @ 05:00) (93% - 100%)  Wt(kg): --    LABS:                          8.9    7.00  )-----------( 179      ( 14 Oct 2023 05:40 )             28.0     10-14    139  |  107  |  23  ----------------------------<  93  4.2   |  27  |  0.76    Ca    8.3<L>      14 Oct 2023 05:40        PTT - ( 12 Oct 2023 12:51 )  PTT:46.4 sec      PE:  General: A&Ox3 NAD  RLE: Dressing C/D/I. abduction pillow in place. Motor intact + EHL/FHL/TA/GS. Sensation is grossly intact. Extremity warm. Compartments soft, compressible, no calf tenderness. DP 2+   LLE: Motor intact + EHL/FHL/TA/GS. Sensation is grossly intact. Extremity warm. Compartments soft, compressible, no calf tenderness. DP 2+       A/P: Patient is a 73y y/o Female s/p R JOHN/abductor repiar POD#2  -wound care, isometric exercises, GI motility, new medications, hospital course reviewed with pt  -Pain control/analgesia  -Inc spirometry reviewed and counseled  -DVT ppx with venodynes and Xarelto  -F/U AM Labs  -PT/OT/PWB, Abductor precuations/Posterior hip precautions. Brace 45/15.  -Antibiotic post op  -Medical consult  -Dspi to [facility] per PT     
Postop Check    Patient tolerated the procedure well. Patient seen and examined at bedside.  No acute complaints at this time. Pain well controlled. Denies chest pain, shortness of breath, nausea or vomiting.     PE:  Vital Signs Last 24 Hrs  T(C): 36.7 (10-12-23 @ 21:07), Max: 37.3 (10-12-23 @ 12:58)  T(F): 98.1 (10-12-23 @ 21:07), Max: 99.2 (10-12-23 @ 12:58)  HR: 88 (10-12-23 @ 21:07) (82 - 97)  BP: 110/72 (10-12-23 @ 21:07) (108/80 - 143/79)  BP(mean): --  RR: 17 (10-12-23 @ 21:07) (12 - 18)  SpO2: 93% (10-12-23 @ 21:07) (92% - 100%)    General: NAD, resting comfortably in bed  RLE:   Dressing in place C/D/I  SCD in place bilaterally  No calf tenderness   TA/EHL/FHL/GSC+  SILT L2-S1  DP/PT 2+        PTT - ( 12 Oct 2023 12:51 )  PTT:46.4 sec    A/P:  73y f s/p R Rev JOHN POD0  -PT/OT -WBAT  -Pain Control  -DVT ppx   -Continue perioperative abx x 24 hours  -FU AM Labs  -Rest, ice, compress and elevate the extremity as we needed  -Incentive Spirometry  -Medical management appreciated    Ortho
SO VIRAMONTES is a 73y Female s/p REVISION RIGHT TOTAL HIP ARTHROPLASTY        denies any chest pain shortness of breath palpitation dizziness lightheadedness nausea vomiting fever or chills    T(C): 36.9 (10-17-23 @ 09:38), Max: 37.6 (10-16-23 @ 22:02)  HR: 85 (10-17-23 @ 09:38) (71 - 92)  BP: 109/66 (10-17-23 @ 09:38) (106/65 - 114/69)  RR: 16 (10-17-23 @ 09:38) (16 - 18)  SpO2: 92% (10-17-23 @ 09:38) (92% - 95%)  no jvd/bruit  s1 s2 rrr  cta  s/nt/nd  no calf tend                        8.3    7.50  )-----------( 210      ( 16 Oct 2023 06:10 )             26.8   10-16    137  |  105  |  17  ----------------------------<  106<H>  4.5   |  31  |  0.62    Ca    8.5      16 Oct 2023 06:10        cont dvt px  pain control  bowel regimen  antiemetics  incentive spirometer

## 2023-10-23 DIAGNOSIS — Z88.5 ALLERGY STATUS TO NARCOTIC AGENT: ICD-10-CM
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
## 2023-10-23 DIAGNOSIS — Z88.1 ALLERGY STATUS TO OTHER ANTIBIOTIC AGENTS STATUS: ICD-10-CM

## 2023-10-23 DIAGNOSIS — Z79.84 LONG TERM (CURRENT) USE OF ORAL HYPOGLYCEMIC DRUGS: ICD-10-CM

## 2023-10-23 DIAGNOSIS — N32.81 OVERACTIVE BLADDER: ICD-10-CM

## 2023-10-23 DIAGNOSIS — Z96.662 PRESENCE OF LEFT ARTIFICIAL ANKLE JOINT: ICD-10-CM

## 2023-10-23 DIAGNOSIS — Z88.2 ALLERGY STATUS TO SULFONAMIDES: ICD-10-CM

## 2023-10-23 DIAGNOSIS — F32.A DEPRESSION, UNSPECIFIED: ICD-10-CM

## 2023-10-23 DIAGNOSIS — F41.9 ANXIETY DISORDER, UNSPECIFIED: ICD-10-CM

## 2023-10-23 DIAGNOSIS — T84.020A DISLOCATION OF INTERNAL RIGHT HIP PROSTHESIS, INITIAL ENCOUNTER: ICD-10-CM
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
## 2023-10-31 ENCOUNTER — APPOINTMENT (OUTPATIENT)
Dept: ORTHOPEDIC SURGERY | Facility: CLINIC | Age: 73
End: 2023-10-31
Payer: MEDICARE

## 2023-10-31 VITALS — BODY MASS INDEX: 35.34 KG/M2 | HEIGHT: 60 IN | WEIGHT: 180 LBS

## 2023-10-31 PROBLEM — F41.9 ANXIETY DISORDER, UNSPECIFIED: Chronic | Status: ACTIVE | Noted: 2023-09-22

## 2023-10-31 PROBLEM — Z86.39 PERSONAL HISTORY OF OTHER ENDOCRINE, NUTRITIONAL AND METABOLIC DISEASE: Chronic | Status: ACTIVE | Noted: 2023-09-22

## 2023-10-31 PROBLEM — N32.81 OVERACTIVE BLADDER: Chronic | Status: ACTIVE | Noted: 2023-09-22

## 2023-10-31 PROCEDURE — 99024 POSTOP FOLLOW-UP VISIT: CPT

## 2023-10-31 PROCEDURE — 73503 X-RAY EXAM HIP UNI 4/> VIEWS: CPT | Mod: RT

## 2023-12-05 ENCOUNTER — APPOINTMENT (OUTPATIENT)
Dept: ORTHOPEDIC SURGERY | Facility: CLINIC | Age: 73
End: 2023-12-05
Payer: MEDICARE

## 2023-12-05 PROCEDURE — 73503 X-RAY EXAM HIP UNI 4/> VIEWS: CPT | Mod: RT

## 2023-12-05 PROCEDURE — 99024 POSTOP FOLLOW-UP VISIT: CPT

## 2023-12-27 NOTE — H&P PST ADULT - PROBLEM SELECTOR PLAN 3
Patient here for INR. Warfarin dose 5 mg Tu/Th/Sa, 7.5 mg M/W/Fr/Alexis     PT 30.0   INR 2.5   Patient scheduled for nurse visit 1/2/24   Continue current medication reigme

## 2024-01-19 ENCOUNTER — APPOINTMENT (OUTPATIENT)
Dept: ORTHOPEDIC SURGERY | Facility: CLINIC | Age: 74
End: 2024-01-19
Payer: MEDICARE

## 2024-01-19 VITALS — BODY MASS INDEX: 35.34 KG/M2 | WEIGHT: 180 LBS | HEIGHT: 60 IN

## 2024-01-19 PROCEDURE — 99213 OFFICE O/P EST LOW 20 MIN: CPT

## 2024-01-19 NOTE — HISTORY OF PRESENT ILLNESS
[de-identified] : 1/19/24: Improving with PT.  Still some lateral and thigh pain.  Previous doc:  7/18/23: 73yo F presents with her daughter for evaluation of her right hip. She has a hx of R JOHN done on 10/15/22 (Dr. Gaming) and a revision done on 11/3/22 (due to periprosthetic fx- no known injury). States she never felt much relief after the revision. Within the past couple of months, she has dislocated the hip twice. States there was no injury at the time of dislocation. She continues to have feelings of instability or "slipping." She had bloodwork done recently to r/o infection. Takes Advil prn.  8/15/23: Here for f/u R hip. She has obtained the CT. She is on the schedule for R JOHN revision for 10/12/23.  10/31/23: 19 days s/p R JOHN revision. She is doing well. Compliant with brace. No longer taking oxy.  12/5/23: PO #2. Almost 2 months s/p R JOHN rev and abductor tendon repair. Pt reports doing well but is slightly paranoid about injuring herself and is taking things slowly. WB with walker and brace.  [FreeTextEntry5] : stiffness/ soreness.

## 2024-01-19 NOTE — ASSESSMENT
[FreeTextEntry1] : 7/18/23: Hx of R hip revision due to periprosthetic fx; hx of 2 dislocations. Lumbar DDD and scoliosis. +Trendelenburg gait. Femur implant looks good, acetabular component is questionable. MRI reviewed today- abductor injury. She had bloodwork done recently to r/o infection. Her combination of rigid spine, lumbar DDD, scoliosis, abductor weakness and revision of the hip likely led to her dislocations. She understands that she is at an increased risk of repeat dislocations. Discussed revision- dual mobility vs. cup exchange; possible abductor repair. Will obtain CT scan R hip to eval for bony alignment of the cup. Will plan for R JOHN revision- depuy gription with dual mobility and constrained liner. Backup reclaim liner. exactech alteon femoral component. Will need exactech heads. Acetabular component was size 36 50 8/15/23: Reviewed the right hip CT. Discussed R JOHN revision, r/b/a, and preop/postop periods with the patient and her daughter in detail. Informed that we could also wait and observe at this point for her to further heal and scar in- she understands that she may or may not dislocate again. She is on the schedule for revision for 10/12/23. I thought that her cup is somewhat neutral and anteverted. May revise part of the stem. Will eval abductors for any injury and do a capsular repair. PT rx provided. f/up in 6 weeks; will re-eval and see if we will proceed with surgery. 10/31/23: 19 days s/p R JOHN revision. She is doing well and is happy with progress. Continue with brace and walker. Does not have to sleep in the brace. Start outpatient PT.  12/5/23: Almost 2 months s/p R JOHN rev and abductor tendon repair. Begin outpatient PT. Discontinue brace and wean off the walker to cane.   1/19/24: 3 months postop doing very well, cont PT.

## 2024-01-19 NOTE — DISCUSSION/SUMMARY
[de-identified] : The patient was advised of the diagnosis.  The natural history of the pathology was explained in full to the patient in layman's terms. All questions were answered.  The risks and benefits of surgical and non-surgical treatment alternatives were explained in full to the patient.

## 2024-02-27 ENCOUNTER — APPOINTMENT (OUTPATIENT)
Dept: ORTHOPEDIC SURGERY | Facility: CLINIC | Age: 74
End: 2024-02-27
Payer: MEDICARE

## 2024-02-27 VITALS — BODY MASS INDEX: 35.34 KG/M2 | HEIGHT: 60 IN | WEIGHT: 180 LBS

## 2024-02-27 PROCEDURE — 99214 OFFICE O/P EST MOD 30 MIN: CPT

## 2024-02-27 NOTE — HISTORY OF PRESENT ILLNESS
[3] : 3 [de-identified] : 2/27/24: 4 mon s/p R JOHN revision. Overall feels she is improving with PT. Gautam with cane   Previous doc:  7/18/23: 71yo F presents with her daughter for evaluation of her right hip. She has a hx of R JOHN done on 10/15/22 (Dr. Gaming) and a revision done on 11/3/22 (due to periprosthetic fx- no known injury). States she never felt much relief after the revision. Within the past couple of months, she has dislocated the hip twice. States there was no injury at the time of dislocation. She continues to have feelings of instability or "slipping." She had bloodwork done recently to r/o infection. Takes Advil prn.  8/15/23: Here for f/u R hip. She has obtained the CT. She is on the schedule for R JOHN revision for 10/12/23.  10/31/23: 19 days s/p R JOHN revision. She is doing well. Compliant with brace. No longer taking oxy.  12/5/23: PO #2. Almost 2 months s/p R JOHN rev and abductor tendon repair. Pt reports doing well but is slightly paranoid about injuring herself and is taking things slowly. WB with walker and brace.  1/19/24: Improving with PT.  Still some lateral and thigh pain. [de-identified] : pt

## 2024-02-27 NOTE — ASSESSMENT
[FreeTextEntry1] : 7/18/23: Hx of R hip revision due to periprosthetic fx; hx of 2 dislocations. Lumbar DDD and scoliosis. +Trendelenburg gait. Femur implant looks good, acetabular component is questionable. MRI reviewed today- abductor injury. She had bloodwork done recently to r/o infection. Her combination of rigid spine, lumbar DDD, scoliosis, abductor weakness and revision of the hip likely led to her dislocations. She understands that she is at an increased risk of repeat dislocations. Discussed revision- dual mobility vs. cup exchange; possible abductor repair. Will obtain CT scan R hip to eval for bony alignment of the cup. Will plan for R JOHN revision- depuy gription with dual mobility and constrained liner. Backup reclaim liner. exactech alteon femoral component. Will need exactech heads. Acetabular component was size 36 50 8/15/23: Reviewed the right hip CT. Discussed R JOHN revision, r/b/a, and preop/postop periods with the patient and her daughter in detail. Informed that we could also wait and observe at this point for her to further heal and scar in- she understands that she may or may not dislocate again. She is on the schedule for revision for 10/12/23. I thought that her cup is somewhat neutral and anteverted. May revise part of the stem. Will eval abductors for any injury and do a capsular repair. PT rx provided. f/up in 6 weeks; will re-eval and see if we will proceed with surgery. 10/31/23: 19 days s/p R JOHN revision. She is doing well and is happy with progress. Continue with brace and walker. Does not have to sleep in the brace. Start outpatient PT.  12/5/23: Almost 2 months s/p R JOHN rev and abductor tendon repair. Begin outpatient PT. Discontinue brace and wean off the walker to cane.  1/19/24: 3 months postop doing very well, cont PT.  2/27/24: 4 months postop 10/12/23. Will renew PT rx as she has been making good progress. Reassured her that strength will take time to build up. f/up in 2 months and repeat XR

## 2024-04-19 NOTE — ASU PREOP CHECKLIST - INTERNAL PROSTHESES
Called pt on both #s available, no answer. Left detailed vm to call back as 04.22.24 appt needs to be rescheduled after 05.03.24 per Isidra EDWARDS.     Please assist in scheduling chemo/MD appt with Isidra EDWARDS after CT scan 05.03.24.   right femur with titanium,right ankle replacement/yes(specify)

## 2024-04-30 ENCOUNTER — APPOINTMENT (OUTPATIENT)
Dept: ORTHOPEDIC SURGERY | Facility: CLINIC | Age: 74
End: 2024-04-30
Payer: MEDICARE

## 2024-04-30 VITALS — WEIGHT: 180 LBS | HEIGHT: 60 IN | BODY MASS INDEX: 35.34 KG/M2

## 2024-04-30 DIAGNOSIS — Z96.649 PRESENCE OF UNSPECIFIED ARTIFICIAL HIP JOINT: ICD-10-CM

## 2024-04-30 DIAGNOSIS — Z96.641 PRESENCE OF RIGHT ARTIFICIAL HIP JOINT: ICD-10-CM

## 2024-04-30 PROCEDURE — 99214 OFFICE O/P EST MOD 30 MIN: CPT

## 2024-04-30 PROCEDURE — 73503 X-RAY EXAM HIP UNI 4/> VIEWS: CPT | Mod: RT

## 2024-04-30 NOTE — HISTORY OF PRESENT ILLNESS
[de-identified] : 4/30/24: 6 months s/p R JOHN rev. Notes lessening pain, has a constant dull ache. Feels a "shifting" sensation in her hip  Previous doc:  7/18/23: 73yo F presents with her daughter for evaluation of her right hip. She has a hx of R JOHN done on 10/15/22 (Dr. Gaming) and a revision done on 11/3/22 (due to periprosthetic fx- no known injury). States she never felt much relief after the revision. Within the past couple of months, she has dislocated the hip twice. States there was no injury at the time of dislocation. She continues to have feelings of instability or "slipping." She had bloodwork done recently to r/o infection. Takes Advil prn.  8/15/23: Here for f/u R hip. She has obtained the CT. She is on the schedule for R JOHN revision for 10/12/23.  10/31/23: 19 days s/p R JOHN revision. She is doing well. Compliant with brace. No longer taking oxy.  12/5/23: PO #2. Almost 2 months s/p R JOHN rev and abductor tendon repair. Pt reports doing well but is slightly paranoid about injuring herself and is taking things slowly. WB with walker and brace.  1/19/24: Improving with PT.  Still some lateral and thigh pain. 2/27/24: 4 mon s/p R JOHN revision. Overall feels she is improving with PT. Amb with cane

## 2024-04-30 NOTE — ASSESSMENT
[FreeTextEntry1] : 7/18/23: Hx of R hip revision due to periprosthetic fx; hx of 2 dislocations. Lumbar DDD and scoliosis. +Trendelenburg gait. Femur implant looks good, acetabular component is questionable. MRI reviewed today- abductor injury. She had bloodwork done recently to r/o infection. Her combination of rigid spine, lumbar DDD, scoliosis, abductor weakness and revision of the hip likely led to her dislocations. She understands that she is at an increased risk of repeat dislocations. Discussed revision- dual mobility vs. cup exchange; possible abductor repair. Will obtain CT scan R hip to eval for bony alignment of the cup. Will plan for R JOHN revision- depuy gription with dual mobility and constrained liner. Backup reclaim liner. exactech alteon femoral component. Will need exactech heads. Acetabular component was size 36 50 8/15/23: Reviewed the right hip CT. Discussed R JOHN revision, r/b/a, and preop/postop periods with the patient and her daughter in detail. Informed that we could also wait and observe at this point for her to further heal and scar in- she understands that she may or may not dislocate again. She is on the schedule for revision for 10/12/23. I thought that her cup is somewhat neutral and anteverted. May revise part of the stem. Will eval abductors for any injury and do a capsular repair. PT rx provided. f/up in 6 weeks; will re-eval and see if we will proceed with surgery. 10/31/23: 19 days s/p R JOHN revision. She is doing well and is happy with progress. Continue with brace and walker. Does not have to sleep in the brace. Start outpatient PT.  12/5/23: Almost 2 months s/p R JOHN rev and abductor tendon repair. Begin outpatient PT. Discontinue brace and wean off the walker to cane.  1/19/24: 3 months postop doing very well, cont PT. 2/27/24: 4 months postop 10/12/23. Will renew PT rx as she has been making good progress. Reassured her that strength will take time to build up. f/up in 2 months and repeat XR   4/30/24: 6 months postop R JOHN rev. She is gradually improving. Advised her to walk to work on her balance. Transition from PT to HEP. Has a lot of muscular pain, but did have a full abductor repair. f/up at 1 year anniversary and repeat XR

## 2024-04-30 NOTE — IMAGING
[Right] : right hip with pelvis [No loss of surgical correlation. Bony alignment acceptable. Hardware in appropriate position] : No loss of surgical correlation. Bony alignment acceptable. Hardware in appropriate position [Components well fixed, in good position] : Components well fixed, in good position [de-identified] : RIGHT HIP Incision is healed NVI Neg trendelenberg Cane

## 2024-09-07 NOTE — H&P PST ADULT - NSANTHOSAYNRD_GEN_A_CORE
Breath sounds clear and equal bilaterally. No. NU screening performed.  STOP BANG Legend: 0-2 = LOW Risk; 3-4 = INTERMEDIATE Risk; 5-8 = HIGH Risk

## 2024-12-09 ENCOUNTER — APPOINTMENT (OUTPATIENT)
Dept: ORTHOPEDIC SURGERY | Facility: CLINIC | Age: 74
End: 2024-12-09
Payer: MEDICARE

## 2024-12-09 ENCOUNTER — RESULT CHARGE (OUTPATIENT)
Age: 74
End: 2024-12-09

## 2024-12-09 VITALS — WEIGHT: 180 LBS | BODY MASS INDEX: 35.34 KG/M2 | HEIGHT: 60 IN

## 2024-12-09 DIAGNOSIS — Z96.649 PRESENCE OF UNSPECIFIED ARTIFICIAL HIP JOINT: ICD-10-CM

## 2024-12-09 DIAGNOSIS — M51.369: ICD-10-CM

## 2024-12-09 DIAGNOSIS — Z96.641 PRESENCE OF RIGHT ARTIFICIAL HIP JOINT: ICD-10-CM

## 2024-12-09 PROCEDURE — 73502 X-RAY EXAM HIP UNI 2-3 VIEWS: CPT

## 2024-12-09 PROCEDURE — 72100 X-RAY EXAM L-S SPINE 2/3 VWS: CPT

## 2024-12-09 PROCEDURE — 99214 OFFICE O/P EST MOD 30 MIN: CPT

## 2024-12-09 RX ORDER — MELOXICAM 15 MG/1
15 TABLET ORAL DAILY
Qty: 30 | Refills: 0 | Status: ACTIVE | COMMUNITY
Start: 2024-12-09 | End: 1900-01-01

## 2025-01-13 ENCOUNTER — APPOINTMENT (OUTPATIENT)
Dept: ORTHOPEDIC SURGERY | Facility: CLINIC | Age: 75
End: 2025-01-13

## 2025-09-11 ENCOUNTER — NON-APPOINTMENT (OUTPATIENT)
Age: 75
End: 2025-09-11

## (undated) DEVICE — OSTEOTOME 10MM X 2.5IN

## (undated) DEVICE — TRAP SPECIMEN SPUTUM 40CC

## (undated) DEVICE — GLV 8.5 PROTEXIS (WHITE)

## (undated) DEVICE — PREP SCRUB BRUSH W CHG 4%

## (undated) DEVICE — POSITIONER FOAM BUMP FLAT TOP 10X6X4" LRG

## (undated) DEVICE — HOOD T5 PEELAWAY

## (undated) DEVICE — PACK BASIC

## (undated) DEVICE — WARMING BLANKET UPPER ADULT

## (undated) DEVICE — FRA-ESU BOVIE FORCE FX F3B25828A: Type: DURABLE MEDICAL EQUIPMENT

## (undated) DEVICE — SUT VICRYL 0 36" CT-1 UNDYED

## (undated) DEVICE — MEDICATION LABELS W MARKER

## (undated) DEVICE — ARTHREX MULTIFIRE SCORPION NEEDLE

## (undated) DEVICE — PACK TOTAL JOINT

## (undated) DEVICE — MIDAS REX MR8 TAPERED SM BORE 2.3MM X 7CM FOOTED

## (undated) DEVICE — NDL HYPO SAFE 22G X 1.5" (BLACK)

## (undated) DEVICE — DRSG DERMABOND PRINEO 22CM

## (undated) DEVICE — SUCTION TIP KAMVAC MINI

## (undated) DEVICE — MIDAS REX MR8 ACORN LG BORE 9MM X 14CM

## (undated) DEVICE — SUT STRATAFIX SPIRAL PDS PLUS 2-0 45CM CT-1

## (undated) DEVICE — DRAPE 1/2 SHEET 40X57"

## (undated) DEVICE — MIDAS REX MR8 TAPERED LG BORE 3MM X 14CM

## (undated) DEVICE — SUT STRATAFIX SYMMETRIC PDS PLUS 1 18" CTX VIOLET

## (undated) DEVICE — VENODYNE/SCD SLEEVE CALF MEDIUM

## (undated) DEVICE — MIDAS REX MR8 METAL CUTTER 1.6MM X 9CM

## (undated) DEVICE — SYR LUER LOK 20CC

## (undated) DEVICE — ARTHREX SCORPION NEEDLE KNEE

## (undated) DEVICE — SUT STRATAFIX SPIRAL MONOCRYL PLUS 4-0 45CM PS-2 UNDYED

## (undated) DEVICE — SAW BLADE STRYKER SAGITTAL DUAL CUT 25X90X1.27MM

## (undated) DEVICE — SUT ETHIBOND 5 4-30" V-37

## (undated) DEVICE — DRAPE 3/4 SHEET W REINFORCEMENT 56X77"

## (undated) DEVICE — MIDAS REX MR8 METAL CUTTER DIAMOND WHEEL 25.4MM X 9CM

## (undated) DEVICE — SYR ASEPTO